# Patient Record
Sex: FEMALE | Race: WHITE | Employment: OTHER | URBAN - METROPOLITAN AREA
[De-identification: names, ages, dates, MRNs, and addresses within clinical notes are randomized per-mention and may not be internally consistent; named-entity substitution may affect disease eponyms.]

---

## 2017-12-21 ENCOUNTER — APPOINTMENT (OUTPATIENT)
Dept: CT IMAGING | Age: 62
End: 2017-12-21
Attending: EMERGENCY MEDICINE
Payer: COMMERCIAL

## 2017-12-21 ENCOUNTER — HOSPITAL ENCOUNTER (EMERGENCY)
Age: 62
Discharge: HOME | End: 2017-12-21
Attending: EMERGENCY MEDICINE
Payer: COMMERCIAL

## 2017-12-21 VITALS
SYSTOLIC BLOOD PRESSURE: 121 MMHG | BODY MASS INDEX: 24.64 KG/M2 | HEIGHT: 68 IN | OXYGEN SATURATION: 97 % | TEMPERATURE: 98.6 F | HEART RATE: 83 BPM | RESPIRATION RATE: 16 BRPM | DIASTOLIC BLOOD PRESSURE: 74 MMHG | WEIGHT: 162.6 LBS

## 2017-12-21 DIAGNOSIS — R10.11 ABDOMINAL PAIN, RIGHT UPPER QUADRANT: Primary | ICD-10-CM

## 2017-12-21 LAB
ALBUMIN SERPL-MCNC: 3.8 G/DL (ref 3.5–5)
ALBUMIN/GLOB SERPL: 1.4 {RATIO} (ref 1.1–2.2)
ALP SERPL-CCNC: 85 U/L (ref 45–117)
ALT SERPL-CCNC: 20 U/L (ref 12–78)
ANION GAP SERPL CALC-SCNC: 5 MMOL/L (ref 5–15)
APPEARANCE UR: CLEAR
AST SERPL-CCNC: 17 U/L (ref 15–37)
BACTERIA URNS QL MICRO: NEGATIVE /HPF
BASOPHILS # BLD: 0 K/UL (ref 0–0.1)
BASOPHILS NFR BLD: 0 % (ref 0–1)
BILIRUB SERPL-MCNC: 0.3 MG/DL (ref 0.2–1)
BILIRUB UR QL: NEGATIVE
BUN SERPL-MCNC: 17 MG/DL (ref 6–20)
BUN/CREAT SERPL: 24 (ref 12–20)
CALCIUM SERPL-MCNC: 8.6 MG/DL (ref 8.5–10.1)
CHLORIDE SERPL-SCNC: 107 MMOL/L (ref 97–108)
CO2 SERPL-SCNC: 29 MMOL/L (ref 21–32)
COLOR UR: NORMAL
CREAT SERPL-MCNC: 0.71 MG/DL (ref 0.55–1.02)
EOSINOPHIL # BLD: 0.1 K/UL (ref 0–0.4)
EOSINOPHIL NFR BLD: 2 % (ref 0–7)
EPITH CASTS URNS QL MICRO: NORMAL /LPF
ERYTHROCYTE [DISTWIDTH] IN BLOOD BY AUTOMATED COUNT: 12.7 % (ref 11.5–14.5)
GLOBULIN SER CALC-MCNC: 2.7 G/DL (ref 2–4)
GLUCOSE SERPL-MCNC: 91 MG/DL (ref 65–100)
GLUCOSE UR STRIP.AUTO-MCNC: NEGATIVE MG/DL
HCT VFR BLD AUTO: 38.4 % (ref 35–47)
HGB BLD-MCNC: 12.5 G/DL (ref 11.5–16)
HGB UR QL STRIP: NEGATIVE
HYALINE CASTS URNS QL MICRO: NORMAL /LPF (ref 0–5)
KETONES UR QL STRIP.AUTO: NEGATIVE MG/DL
LEUKOCYTE ESTERASE UR QL STRIP.AUTO: NEGATIVE
LIPASE SERPL-CCNC: 221 U/L (ref 73–393)
LYMPHOCYTES # BLD: 1.1 K/UL (ref 0.8–3.5)
LYMPHOCYTES NFR BLD: 21 % (ref 12–49)
MCH RBC QN AUTO: 29.6 PG (ref 26–34)
MCHC RBC AUTO-ENTMCNC: 32.6 G/DL (ref 30–36.5)
MCV RBC AUTO: 91 FL (ref 80–99)
MONOCYTES # BLD: 0.3 K/UL (ref 0–1)
MONOCYTES NFR BLD: 6 % (ref 5–13)
NEUTS SEG # BLD: 3.6 K/UL (ref 1.8–8)
NEUTS SEG NFR BLD: 71 % (ref 32–75)
NITRITE UR QL STRIP.AUTO: NEGATIVE
PH UR STRIP: 7.5 [PH] (ref 5–8)
PLATELET # BLD AUTO: 206 K/UL (ref 150–400)
POTASSIUM SERPL-SCNC: 4 MMOL/L (ref 3.5–5.1)
PROT SERPL-MCNC: 6.5 G/DL (ref 6.4–8.2)
PROT UR STRIP-MCNC: NEGATIVE MG/DL
RBC # BLD AUTO: 4.22 M/UL (ref 3.8–5.2)
RBC #/AREA URNS HPF: NORMAL /HPF (ref 0–5)
SODIUM SERPL-SCNC: 141 MMOL/L (ref 136–145)
SP GR UR REFRACTOMETRY: 1.01 (ref 1–1.03)
UR CULT HOLD, URHOLD: NORMAL
UROBILINOGEN UR QL STRIP.AUTO: 0.2 EU/DL (ref 0.2–1)
WBC # BLD AUTO: 5.1 K/UL (ref 3.6–11)
WBC URNS QL MICRO: NORMAL /HPF (ref 0–4)

## 2017-12-21 PROCEDURE — 74011636320 HC RX REV CODE- 636/320: Performed by: EMERGENCY MEDICINE

## 2017-12-21 PROCEDURE — 74011250636 HC RX REV CODE- 250/636: Performed by: EMERGENCY MEDICINE

## 2017-12-21 PROCEDURE — 83690 ASSAY OF LIPASE: CPT | Performed by: EMERGENCY MEDICINE

## 2017-12-21 PROCEDURE — 74011000258 HC RX REV CODE- 258: Performed by: EMERGENCY MEDICINE

## 2017-12-21 PROCEDURE — 85025 COMPLETE CBC W/AUTO DIFF WBC: CPT | Performed by: EMERGENCY MEDICINE

## 2017-12-21 PROCEDURE — 96374 THER/PROPH/DIAG INJ IV PUSH: CPT

## 2017-12-21 PROCEDURE — 99283 EMERGENCY DEPT VISIT LOW MDM: CPT

## 2017-12-21 PROCEDURE — 96361 HYDRATE IV INFUSION ADD-ON: CPT

## 2017-12-21 PROCEDURE — 81001 URINALYSIS AUTO W/SCOPE: CPT | Performed by: EMERGENCY MEDICINE

## 2017-12-21 PROCEDURE — 96375 TX/PRO/DX INJ NEW DRUG ADDON: CPT

## 2017-12-21 PROCEDURE — 74011000250 HC RX REV CODE- 250: Performed by: EMERGENCY MEDICINE

## 2017-12-21 PROCEDURE — 36415 COLL VENOUS BLD VENIPUNCTURE: CPT | Performed by: EMERGENCY MEDICINE

## 2017-12-21 PROCEDURE — 80053 COMPREHEN METABOLIC PANEL: CPT | Performed by: EMERGENCY MEDICINE

## 2017-12-21 PROCEDURE — 74177 CT ABD & PELVIS W/CONTRAST: CPT

## 2017-12-21 RX ORDER — DICYCLOMINE HYDROCHLORIDE 10 MG/ML
20 INJECTION INTRAMUSCULAR
Status: DISCONTINUED | OUTPATIENT
Start: 2017-12-21 | End: 2017-12-21 | Stop reason: HOSPADM

## 2017-12-21 RX ORDER — DICYCLOMINE HYDROCHLORIDE 20 MG/1
20 TABLET ORAL EVERY 6 HOURS
Qty: 20 TAB | Refills: 0 | Status: SHIPPED | OUTPATIENT
Start: 2017-12-21 | End: 2017-12-26

## 2017-12-21 RX ORDER — ONDANSETRON 2 MG/ML
4 INJECTION INTRAMUSCULAR; INTRAVENOUS
Status: COMPLETED | OUTPATIENT
Start: 2017-12-21 | End: 2017-12-21

## 2017-12-21 RX ORDER — SODIUM CHLORIDE 0.9 % (FLUSH) 0.9 %
10 SYRINGE (ML) INJECTION
Status: COMPLETED | OUTPATIENT
Start: 2017-12-21 | End: 2017-12-21

## 2017-12-21 RX ADMIN — SODIUM CHLORIDE 1000 ML: 900 INJECTION, SOLUTION INTRAVENOUS at 10:22

## 2017-12-21 RX ADMIN — IOPAMIDOL 100 ML: 755 INJECTION, SOLUTION INTRAVENOUS at 11:39

## 2017-12-21 RX ADMIN — Medication 10 ML: at 11:39

## 2017-12-21 RX ADMIN — FAMOTIDINE 20 MG: 10 INJECTION, SOLUTION INTRAVENOUS at 10:21

## 2017-12-21 RX ADMIN — SODIUM CHLORIDE 100 ML: 900 INJECTION, SOLUTION INTRAVENOUS at 11:39

## 2017-12-21 RX ADMIN — ONDANSETRON 4 MG: 2 INJECTION INTRAMUSCULAR; INTRAVENOUS at 10:22

## 2017-12-21 NOTE — ED PROVIDER NOTES
Patient is a 57 y.o. female presenting with abdominal pain. Abdominal Pain    Pertinent negatives include no diarrhea, no vomiting, no dysuria, no headaches and no back pain. Pt states that she has had intermittent, episodic stabbing RUQ pain since February. She has had a \"normal ultrasound\" and is scheduled for a hida scan on Dec 26, 2017 but states that she can not tolerate the pain. Denies any fever, difficulty breathing, difficulty swallowing, SOB or chest pain. Denies any nausea, vomiting or diarrhea. Pt. Reports that she has not had any medications today prior to arrival.   Old charts reviewed    Past Medical History:   Diagnosis Date    Hyperlipidemia     Hypothyroid     Psychiatric disorder     depression       Past Surgical History:   Procedure Laterality Date    HX HYSTERECTOMY      HX ORTHOPAEDIC      R thumb joint replacement    HX ORTHOPAEDIC      L thumb         History reviewed. No pertinent family history. Social History     Social History    Marital status: SINGLE     Spouse name: N/A    Number of children: N/A    Years of education: N/A     Occupational History    Not on file. Social History Main Topics    Smoking status: Never Smoker    Smokeless tobacco: Never Used    Alcohol use No    Drug use: No    Sexual activity: Not on file     Other Topics Concern    Not on file     Social History Narrative         ALLERGIES: Bactrim [sulfamethoprim ds]; Codeine; Morphine; Phenergan fortis; and Ultram [tramadol]    Review of Systems   Constitutional: Negative for activity change and appetite change. HENT: Negative for facial swelling, sore throat and trouble swallowing. Eyes: Negative. Respiratory: Negative for shortness of breath. Cardiovascular: Negative. Gastrointestinal: Positive for abdominal pain. Negative for diarrhea and vomiting. RUQ pain   Genitourinary: Negative for dysuria. Musculoskeletal: Negative for back pain and neck pain.    Skin: Negative for color change. Neurological: Negative for headaches. Psychiatric/Behavioral: Negative. Vitals:    12/21/17 0947   BP: 134/83   Pulse: 84   Resp: 16   Temp: 98.4 °F (36.9 °C)   SpO2: 99%   Weight: 73.8 kg (162 lb 9.6 oz)   Height: 5' 8\" (1.727 m)            Physical Exam   Constitutional: She is oriented to person, place, and time. She appears well-nourished. White female; non smoker; retired   HENT:   Head: Normocephalic. Right Ear: External ear normal.   Left Ear: External ear normal.   Mouth/Throat: Oropharynx is clear and moist.   Eyes: Pupils are equal, round, and reactive to light. Neck: Normal range of motion. Neck supple. Cardiovascular: Normal rate and regular rhythm. Pulmonary/Chest: Effort normal and breath sounds normal.   Abdominal: Soft. Bowel sounds are normal. She exhibits no distension and no mass. There is tenderness. There is no rebound and no guarding. RUQ pain   Musculoskeletal: Normal range of motion. Neurological: She is alert and oriented to person, place, and time. Skin: Skin is warm and dry. No rash noted. Nursing note and vitals reviewed. MDM  ED Course       Procedures    Pt  Has been re-examined and denies any new c/o pain and discomfort. Plan to try short course of bentyl with close gastroenterology follow up for possible endoscopy. Encourage pt to maintain a food diary. 1:07 PM  Patient's results and plan of care have been reviewed with her  Patient has verbally conveyed her  understanding and agreement of her signs, symptoms, diagnosis, treatment and prognosis and additionally agrees to follow up as recommended or return to the Emergency Room should her condition change prior to follow-up. Discharge instructions have also been provided to the patient with some educational information regarding her diagnosis as well a list of reasons why she would want to return to the ER prior to her follow-up appointment should her condition change. Mahin Dunham Lisa, NP

## 2017-12-21 NOTE — ED TRIAGE NOTES
Pt. complains of RUQ abd pain \"for a while\". Seen by MD in October and wanted pt to have HIDA scan, scheduled for Tuesday. Pt. states pain now radiates into her back and is having nausea, feeling worse.

## 2017-12-21 NOTE — DISCHARGE INSTRUCTIONS
Abdominal Pain: Care Instructions  Your Care Instructions    Abdominal pain has many possible causes. Some aren't serious and get better on their own in a few days. Others need more testing and treatment. If your pain continues or gets worse, you need to be rechecked and may need more tests to find out what is wrong. You may need surgery to correct the problem. Don't ignore new symptoms, such as fever, nausea and vomiting, urination problems, pain that gets worse, and dizziness. These may be signs of a more serious problem. Your doctor may have recommended a follow-up visit in the next 8 to 12 hours. If you are not getting better, you may need more tests or treatment. The doctor has checked you carefully, but problems can develop later. If you notice any problems or new symptoms, get medical treatment right away. Follow-up care is a key part of your treatment and safety. Be sure to make and go to all appointments, and call your doctor if you are having problems. It's also a good idea to know your test results and keep a list of the medicines you take. How can you care for yourself at home? · Rest until you feel better. · To prevent dehydration, drink plenty of fluids, enough so that your urine is light yellow or clear like water. Choose water and other caffeine-free clear liquids until you feel better. If you have kidney, heart, or liver disease and have to limit fluids, talk with your doctor before you increase the amount of fluids you drink. · If your stomach is upset, eat mild foods, such as rice, dry toast or crackers, bananas, and applesauce. Try eating several small meals instead of two or three large ones. · Wait until 48 hours after all symptoms have gone away before you have spicy foods, alcohol, and drinks that contain caffeine. · Do not eat foods that are high in fat. · Avoid anti-inflammatory medicines such as aspirin, ibuprofen (Advil, Motrin), and naproxen (Aleve).  These can cause stomach upset. Talk to your doctor if you take daily aspirin for another health problem. When should you call for help? Call 911 anytime you think you may need emergency care. For example, call if:  ? · You passed out (lost consciousness). ? · You pass maroon or very bloody stools. ? · You vomit blood or what looks like coffee grounds. ? · You have new, severe belly pain. ?Call your doctor now or seek immediate medical care if:  ? · Your pain gets worse, especially if it becomes focused in one area of your belly. ? · You have a new or higher fever. ? · Your stools are black and look like tar, or they have streaks of blood. ? · You have unexpected vaginal bleeding. ? · You have symptoms of a urinary tract infection. These may include:  ¨ Pain when you urinate. ¨ Urinating more often than usual.  ¨ Blood in your urine. ? · You are dizzy or lightheaded, or you feel like you may faint. ? Watch closely for changes in your health, and be sure to contact your doctor if:  ? · You are not getting better after 1 day (24 hours). Where can you learn more? Go to http://edmundo-kadie.info/. Enter K517 in the search box to learn more about \"Abdominal Pain: Care Instructions. \"  Current as of: March 20, 2017  Content Version: 11.4  © 9975-1415 [employer]. Care instructions adapted under license by [employer] (which disclaims liability or warranty for this information). If you have questions about a medical condition or this instruction, always ask your healthcare professional. [place] [address] any warranty or liability for your use of this information.

## 2018-02-26 ENCOUNTER — HOSPITAL ENCOUNTER (OUTPATIENT)
Dept: NUCLEAR MEDICINE | Age: 63
Discharge: HOME | End: 2018-02-26
Attending: GENERAL PRACTICE
Payer: MEDICARE

## 2018-02-26 VITALS — WEIGHT: 165 LBS | BODY MASS INDEX: 25.09 KG/M2

## 2018-02-26 DIAGNOSIS — K82.8 ADHESION OF GALLBLADDER: ICD-10-CM

## 2018-02-26 DIAGNOSIS — R10.11 ABDOMINAL PAIN, RIGHT UPPER QUADRANT: ICD-10-CM

## 2018-02-26 PROCEDURE — 74011250636 HC RX REV CODE- 250/636

## 2018-02-26 PROCEDURE — 78227 HEPATOBIL SYST IMAGE W/DRUG: CPT

## 2018-02-26 PROCEDURE — 74011000258 HC RX REV CODE- 258

## 2018-02-26 RX ORDER — SODIUM CHLORIDE 0.9 % (FLUSH) 0.9 %
10 SYRINGE (ML) INJECTION
Status: COMPLETED | OUTPATIENT
Start: 2018-02-26 | End: 2018-02-26

## 2018-02-26 RX ORDER — SODIUM CHLORIDE 9 MG/ML
25 INJECTION, SOLUTION INTRAVENOUS
Status: COMPLETED | OUTPATIENT
Start: 2018-02-26 | End: 2018-02-26

## 2018-02-26 RX ADMIN — SINCALIDE 1.5 MCG: 5 INJECTION, POWDER, LYOPHILIZED, FOR SOLUTION INTRAVENOUS at 12:10

## 2018-02-26 RX ADMIN — SODIUM CHLORIDE 25 ML: 900 INJECTION, SOLUTION INTRAVENOUS at 12:10

## 2018-02-26 RX ADMIN — Medication 10 ML: at 11:05

## 2018-03-06 ENCOUNTER — HOSPITAL ENCOUNTER (OUTPATIENT)
Dept: MRI IMAGING | Age: 63
Discharge: HOME | End: 2018-03-06
Attending: GENERAL PRACTICE
Payer: MEDICARE

## 2018-03-06 VITALS — BODY MASS INDEX: 25.09 KG/M2 | WEIGHT: 165 LBS

## 2018-03-06 DIAGNOSIS — R10.11 ABDOMINAL PAIN, RIGHT UPPER QUADRANT: ICD-10-CM

## 2018-03-06 PROCEDURE — 74183 MRI ABD W/O CNTR FLWD CNTR: CPT

## 2018-03-06 PROCEDURE — A9577 INJ MULTIHANCE: HCPCS

## 2018-03-06 PROCEDURE — 74011250636 HC RX REV CODE- 250/636

## 2018-03-06 PROCEDURE — 74011000258 HC RX REV CODE- 258

## 2018-03-06 RX ADMIN — GADOBENATE DIMEGLUMINE 15 ML: 529 INJECTION, SOLUTION INTRAVENOUS at 19:00

## 2018-03-06 RX ADMIN — SODIUM CHLORIDE 40 ML: 900 INJECTION, SOLUTION INTRAVENOUS at 19:00

## 2018-09-03 ENCOUNTER — APPOINTMENT (OUTPATIENT)
Dept: CT IMAGING | Age: 63
End: 2018-09-03
Attending: EMERGENCY MEDICINE
Payer: MEDICARE

## 2018-09-03 ENCOUNTER — HOSPITAL ENCOUNTER (EMERGENCY)
Age: 63
Discharge: HOME | End: 2018-09-03
Attending: EMERGENCY MEDICINE
Payer: MEDICARE

## 2018-09-03 VITALS
OXYGEN SATURATION: 98 % | DIASTOLIC BLOOD PRESSURE: 58 MMHG | WEIGHT: 181 LBS | BODY MASS INDEX: 28.41 KG/M2 | HEART RATE: 83 BPM | RESPIRATION RATE: 15 BRPM | SYSTOLIC BLOOD PRESSURE: 120 MMHG | HEIGHT: 67 IN | TEMPERATURE: 97.9 F

## 2018-09-03 DIAGNOSIS — R51.9 ACUTE NONINTRACTABLE HEADACHE, UNSPECIFIED HEADACHE TYPE: Primary | ICD-10-CM

## 2018-09-03 PROCEDURE — 99283 EMERGENCY DEPT VISIT LOW MDM: CPT

## 2018-09-03 PROCEDURE — 70450 CT HEAD/BRAIN W/O DYE: CPT

## 2018-09-03 NOTE — ED NOTES
Patient verbalizes understanding of discharge instructions. Opportunity for questions provided. Patient in no apparent distress, VSS. Patient ambulatory upon discharge. . 
Visit Vitals  /58 (BP 1 Location: Left arm, BP Patient Position: Sitting)  Pulse 83  Temp 97.9 °F (36.6 °C)  Resp 15  Ht 5' 7\" (1.702 m)  Wt 82.1 kg (181 lb)  SpO2 98%  BMI 28.35 kg/m2

## 2018-09-03 NOTE — ED PROVIDER NOTES
HPI Comments: 70 y.o. female with past medical history significant for hypothyroidism, depression, and cervical CA who presents from home via private vehicle with chief complaint of headache. Pt reports being woken up with a pulsating throbbing in the back of her head about 0500 this morning that has gradually gotten better since. Pt states that she has had a localized pulsating occipital HA intermittently over the last 3 months, notes they have become more diffuse over time. Pt notes that there are no patterns in the onset of her pulsating HA. Pt reports visiting her PCP for the HA about 1.5 months ago without obtaining any treatment or medical advice for HA - has not had imaging. Pt states she is under stress due to her 's hospitalization for a stroke, but also notes that she did not initially get this HA while under stress. Pt reports a h/o of an ocular migraine but reports that the current HA is not similar to the ocular migraine. Pt also states she wears glasses for reading and for nearsightedness, and mentions that she has not seen her ophthalmologist in a year. Pt denies taking any medications PTA today, but notes taking Advil for the HAs intermittently without significant relief. Pt denies any fever, blurry vision, vomiting, nausea, problems with coordination, problems driving, head injury, numbness or weakness. There are no other acute medical concerns at this time. Old Chart Review: Last ED visit was 12/21/17 for RUQ abdominal pain, CT abdomen was normal. In March 2018, pt had an MRI of her abdomen that was normal.  
 
Social hx: Nonsmoker; No EtOH use PCP: Sumit Lai DO Note written by Beuford Gowers, Scribe, as dictated by Kimberly Reid DO 8:42 AM 
 
The history is provided by the patient and medical records. No  was used. Past Medical History:  
Diagnosis Date  Cervical ca ([facility] [address]  Hyperlipidemia  Hypothyroid  Psychiatric disorder depression Past Surgical History:  
Procedure Laterality Date  HX HYSTERECTOMY  HX ORTHOPAEDIC    
 R thumb joint replacement  HX ORTHOPAEDIC    
 L thumb History reviewed. No pertinent family history. Social History Social History  Marital status: SINGLE Spouse name: N/A  
 Number of children: N/A  
 Years of education: N/A Occupational History  Not on file. Social History Main Topics  Smoking status: Never Smoker  Smokeless tobacco: Never Used  Alcohol use No  
 Drug use: No  
 Sexual activity: Not on file Other Topics Concern  Not on file Social History Narrative ALLERGIES: Bactrim [sulfamethoprim ds]; Codeine; Morphine; Phenergan fortis; and Ultram [tramadol] Review of Systems Constitutional: Negative for fever. Eyes: Negative for visual disturbance. Gastrointestinal: Negative for nausea and vomiting. Musculoskeletal: Negative for gait problem. Neurological: Positive for headaches. Negative for speech difficulty, weakness and numbness. All other systems reviewed and are negative. Vitals:  
 09/03/18 0715 BP: 137/79 Pulse: 82 Resp: 18 Temp: 97.5 °F (36.4 °C) SpO2: 97% Weight: 82.1 kg (181 lb) Height: 5' 7\" (1.702 m) Physical Exam  
  
Constitutional: Pt is awake and alert. Pt appears well-developed and well-nourished. NAD. HENT:  
Head: Normocephalic and atraumatic. Nose: Nose normal.  
Mouth/Throat: Oropharynx is clear and moist. No oropharyngeal exudate. Eyes: Conjunctivae and extraocular motions are normal. Pupils are equal, round, and reactive to light. Right eye exhibits no discharge. Left eye exhibits no discharge. No scleral icterus. No papilledema. Neck: No tracheal deviation present. Supple neck. Cardiovascular: Normal rate, regular rhythm, normal heart sounds and intact distal pulses. Exam reveals no gallop and no friction rub. No carotid bruits or murmurs heard. Pulmonary/Chest: Effort normal and breath sounds normal.  Pt  has no wheezes. Pt  has no rales. Abdominal: Soft. Pt  exhibits no distension and no mass. No tenderness. Pt  has no rebound and no guarding. Musculoskeletal:  Pt  exhibits no edema and no tenderness. Ext: Normal ROM in all four extremities; not tender to palpation; distal pulses are normal, no edema. Neurological:  Pt is alert. nonfocal neuro exam. Sensation and motor equal to bilateral lower extremities. No pronator drift, no facial asymmetry. Skin: Skin is warm and dry. Pt  is not diaphoretic. Psychiatric:  Pt  has a normal mood and affect. Behavior is normal.  
 
Note written by Loc Haley, as dictated by Eleuterio Herrera, DO 8:42 AM 
 
[facility] 
 
 
ED Course Procedures CT neg DC with PCP f/u [facility]

## 2018-09-03 NOTE — DISCHARGE INSTRUCTIONS
Headache: After Your Visit to the Emergency Room  Your Care Instructions  Headaches have many possible causes. Most headaches are not a sign of serious problems and will get better on their own. Home treatment may help you feel better soon. Even though you have been released from the emergency room, you still need to watch for any problems. The doctor carefully checked you. But sometimes problems can develop later. If you have new symptoms, or if your symptoms do not get better, return to the emergency room or call your doctor right away. A visit to the emergency room is only one step in your treatment. Even if you feel better, you still need to do what your doctor recommends, such as going to all suggested follow-up appointments and taking medicines exactly as directed. This will help you recover and help prevent future problems. How can you care for yourself at home? · Have someone drive you home if you have taken pain medicine. Do not drive yourself. · Rest in a quiet, dark room until your headache is gone. Close your eyes, and try to relax or go to sleep. Do not watch TV or read. · Put ice or a cold pack on the area for 10 to 20 minutes at a time. Put a thin cloth between the ice and your skin. · Use a warm, moist towel or a heating pad set on low to relax tight shoulder and neck muscles. · Have someone gently massage your neck and shoulders. · Take pain medicines exactly as directed. ¨ If the doctor gave you a prescription medicine for pain, take it as prescribed. ¨ If you are not taking a prescription pain medicine, ask your doctor if you can take an over-the-counter medicine. · Limit your caffeine intake to 1 to 2 cups a day. People who drink a lot of caffeine often get a headache several hours after their last drink of caffeine. Or they may wake up with a headache that is relieved by drinking caffeine. Cut down slowly to avoid caffeine-withdrawal headaches.   · Seek help if you have depression or anxiety. Your headaches may be linked to these conditions. When should you call for help? Call 911 if:  · You have signs of a stroke. These may include:  ¨ Sudden numbness, paralysis, or weakness in your face, arm, or leg, especially on only one side of your body. ¨ New problems with walking or balance. ¨ Sudden vision changes. ¨ Drooling or slurred speech. ¨ New problems speaking or understanding simple statements, or feeling confused. ¨ A sudden, severe headache that is different from past headaches. · You have other symptoms that you think are a medical emergency. Return to the emergency room now if:  · You get a fever and a stiff neck. · Your headache gets worse. · You have new nausea and vomiting, or you cannot keep down food or liquids. Call your doctor today if:  · Your headache does not get better within 1 to 2 days. · Your headaches get worse or happen more often. Where can you learn more? Go to Bazinga.be  Enter D038 in the search box to learn more about \"Headache: After Your Visit to the Emergency Room. \"   © 2390-9385 [employer]. Care instructions adapted under license by [employer] (which disclaims liability or warranty for this information). This care instruction is for use with your licensed healthcare professional. If you have questions about a medical condition or this instruction, always ask your healthcare professional. [place] [address] any warranty or liability for your use of this information.   Content Version: 9.3.34665; Last Revised: October 20, 2011

## 2018-09-03 NOTE — ED TRIAGE NOTES
Triage note: Patient c/o \"pulsating sensation\" to back of head beginning in June, intermittent. Patient reports 5/10 headache currently, beginning at 6am today. Patient denies visual changes, weakness or numbness, slurred speech or gait disturbances.

## 2018-09-25 ENCOUNTER — OFFICE VISIT (OUTPATIENT)
Dept: NEUROLOGY | Age: 63
End: 2018-09-25

## 2018-09-25 VITALS
BODY MASS INDEX: 26.47 KG/M2 | RESPIRATION RATE: 18 BRPM | HEART RATE: 94 BPM | SYSTOLIC BLOOD PRESSURE: 120 MMHG | OXYGEN SATURATION: 97 % | WEIGHT: 169 LBS | DIASTOLIC BLOOD PRESSURE: 80 MMHG

## 2018-09-25 DIAGNOSIS — G44.52 NEW DAILY PERSISTENT HEADACHE: ICD-10-CM

## 2018-09-25 DIAGNOSIS — H93.A3 PULSATILE TINNITUS OF BOTH EARS: Primary | ICD-10-CM

## 2018-09-25 DIAGNOSIS — R51.9 OCCIPITAL PAIN: ICD-10-CM

## 2018-09-25 NOTE — PROGRESS NOTES
Chief Complaint Patient presents with  
 Headache HISTORY OF PRESENT ILLNESS Marvin Haider is a 70 y.o. female who came in to discuss some symptoms that she has had for the past 2-3 months. She has been keeping mild headache mainly in the occipital head region and sometimes she will get a pulsating feeling in the back of her head that will continue for a few minutes and then subsides. It can happen multiple times a day. She does have a history of migraines/ocular migraines but this does not feel like her typical migraines. No changes in vision, speech or focal motor or sensory deficits. She recently was having these symptoms quite frequently and had to go to the emergency department. She had a CT brain which was negative. Sometimes the pulsating sensation will make her entire head throb/move. 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 Past Medical History:  
Diagnosis Date  Cervical ca ([facility] [address]  Hyperlipidemia  Hypothyroid  Psychiatric disorder   
 depression Current Outpatient Prescriptions Medication Sig  
 OTHER Estrogen Compound 1 capsule daily  metroNIDAZOLE (METROCREAM) 0.75 % topical cream Apply  to affected area daily. Use a thin layer to affected areas after washing  levothyroxine (SYNTHROID) 75 mcg tablet Take  by mouth Daily (before breakfast).  escitalopram (LEXAPRO) 20 mg tablet Take 20 mg by mouth daily.  testosterone (ANDROGEL) 50 mg/5 gram (1 %) gel 5 g by TransDERmal route daily. No current facility-administered medications for this visit. Allergies Allergen Reactions  Bactrim [Sulfamethoprim Ds] Unknown (comments)  Codeine Nausea Only  Morphine Itching  Phenergan Fortis Other (comments) Parkinson like symptoms  Ultram [Tramadol] Nausea Only Family History Problem Relation Age of Onset  Heart Disease Mother  Heart Disease Father  Stroke Brother Social History Substance Use Topics  Smoking status: Never Smoker  Smokeless tobacco: Never Used  Alcohol use No  
 
Past Surgical History:  
Procedure Laterality Date  HX HYSTERECTOMY  HX ORTHOPAEDIC    
 R thumb joint replacement  HX ORTHOPAEDIC    
 L thumb REVIEW OF SYSTEMS Review of Systems - History obtained from the patient Psychological ROS: negative ENT ROS: negative Hematological and Lymphatic ROS: negative Endocrine ROS: negative Respiratory ROS: no cough, shortness of breath, or wheezing Cardiovascular ROS: no chest pain or dyspnea on exertion Gastrointestinal ROS: no abdominal pain, change in bowel habits, or black or bloody stools Genito-Urinary ROS: no dysuria, trouble voiding, or hematuria Musculoskeletal ROS: negative Dermatological ROS: negative PHYSICAL EXAMINATION:   
Visit Vitals  /80  Pulse 94  Resp 18  Wt 76.7 kg (169 lb)  SpO2 97%  BMI 26.47 kg/m2 General:  Well defined, nourished, and groomed individual in no acute distress. Neck: Supple, nontender, no bruits, no pain with resistance to active range of motion. Heart: Regular rate and rhythm, no murmurs, rub, or gallop. Normal S1S2. Lungs:  Clear to auscultation bilaterally with equal chest expansion, no cough, no wheeze Musculoskeletal:  Extremities revealed no edema and had full range of motion of joints. Psych:  Good mood and bright affect NEUROLOGICAL EXAMINATION:    
Mental Status:   Alert and oriented to person, place, and time . Attention span and concentration are normal. Speech is fluent Cranial Nerves:   
II, III, IV, VI:  Visual acuity grossly intact. Visual fields are normal.   
Pupils are equal, round, and reactive to light and accommodation. Extra-ocular movements are full and fluid. Fundoscopic exam was benign, no ptosis or nystagmus. V-XII: Hearing is grossly intact. Facial features are symmetric, with normal sensation and strength. The palate rises symmetrically and the tongue protrudes midline. Sternocleidomastoids 5/5. Motor Examination: Normal tone, bulk, and strength. 5/5 muscle strength throughout. No cogwheel rigidity or clonus present. Sensory exam:  Normal throughout to pinprick, temperature, and vibration sense. Normal proprioception. Coordination:  Finger to nose and rapid arm movement testing was normal.   No resting or intention tremor Gait and Station:  Steady. Normal arm swing. No Rhomberg or pronator drift. No muscle wasting or fasiculations noted. Reflexes:  DTRs 2+ throughout. Toes downgoing. LABS / IMAGING 
CT Results (most recent): 
 
Results from Hospital Encounter encounter on 09/03/18 CT HEAD WO CONT Narrative EXAM:  CT HEAD WO CONT INDICATION:   posterior throbbing headache. Intermittent x few months. COMPARISON: CT head 10/17/2012. TECHNIQUE: Unenhanced CT of the head was performed using 5 mm images. Brain and 
bone windows were generated. CT dose reduction was achieved through use of a 
standardized protocol tailored for this examination and automatic exposure 
control for dose modulation. FINDINGS: 
The ventricles are normal in size and position. Basilar cisterns are patent. No 
midline shift. There is no evidence of acute infarct, hemorrhage, or extraaxial 
fluid collection. Unchanged small calcification in the left dentate nucleus. The paranasal sinuses, mastoid air cells, and middle ears are clear. The orbital 
contents are within normal limits. There are no significant osseous or 
extracranial soft tissue lesions. Impression IMPRESSION: 
1. No evidence of acute intracranial abnormality. ASSESSMENT 
  ICD-10-CM ICD-9-CM 1. Pulsatile tinnitus of both ears H93. A3 388.30 MRI BRAIN WO CONT  
   MRA BRAIN WO CONT 2. Occipital pain R51 784.0 MRI BRAIN WO CONT 3. New daily persistent headache G44.52 339.42 MRI BRAIN WO CONT  
   MRA BRAIN WO CONT  
 
 
DISCUSSION Ms. Komal Luna has had multiple symptoms including new daily persistent dull headache in the occipital head region along with the pulsating sensation that comes and goes. She has also been having ringing sounds in her ears bilaterally that are in sync with the heartbeat. Since her CT brain was negative, I have recommended MRI brain to rule out structural posterior fossa abnormality and also MRA to rule ou aneurysm/vascular malformation Yamileth Cole MD 
Diplomate, American Board of Psychiatry & Neurology (Neurology) [place] Board of Psychiatry & Neurology (Clinical Neurophysiology) Diplomate, [address] [place] [address] Board of Electrodiagnostic Medicine

## 2018-09-25 NOTE — PATIENT INSTRUCTIONS

## 2018-09-25 NOTE — MR AVS SNAPSHOT
[facility] [address] 
[phone] Patient: Pramod Colon MRN: [id number] Visit Information Date & Time Provider Department Dept. Phone Encounter #  
 9/25/2018  1:00 PM Marisol Israel MD [facility] at [address] [id number] Follow-up Instructions Return if symptoms worsen or fail to improve. Upcoming Health Maintenance Date Due Hepatitis C Screening 1955 DTaP/Tdap/Td series (1 - Tdap) 1/24/1974 Shingrix Vaccine Age 50> (1 of 2) 1/24/2003 BREAST CANCER SCRN MAMMOGRAM 1/24/2003 FOBT Q 1 YEAR AGE 50-75 1/24/2003 GLAUCOMA SCREENING Q2Y 1/24/2018 Bone Densitometry (Dexa) Screening 1/24/2018 Pneumococcal 65+ Low/Medium Risk (1 of 2 - PCV13) 1/24/2018 MEDICARE YEARLY EXAM 3/20/2018 Influenza Age 5 to Adult 8/1/2018 Allergies as of 9/25/2018  Review Complete On: 9/25/2018 By: Marisol Israel MD  
  
 Severity Noted Reaction Type Reactions Bactrim [Sulfamethoprim Ds]  08/02/2012    Unknown (comments) Codeine  08/02/2012    Nausea Only Morphine  12/21/2017    Itching Phenergan Fortis  08/02/2012    Other (comments) Parkinson like symptoms Ultram [Tramadol]  08/02/2012    Nausea Only Current Immunizations  Never Reviewed No immunizations on file. Not reviewed this visit You Were Diagnosed With   
  
 Codes Comments Pulsatile tinnitus of both ears    -  Primary ICD-10-CM: H93. A3 
ICD-9-CM: 388.30 Occipital pain     ICD-10-CM: R51 ICD-9-CM: 162. 0 New daily persistent headache     ICD-10-CM: G44.52 
ICD-9-CM: 339.42 Vitals BP Pulse Resp Weight(growth percentile) SpO2 BMI  
 120/80 94 18 169 lb (76.7 kg) 97% 26.47 kg/m2 OB Status Smoking Status Postmenopausal Never Smoker Vitals History BMI and BSA Data Body Mass Index Body Surface Area  
 26.47 kg/m 2 1.9 m 2 Your Updated Medication List  
  
   
This list is accurate as of 9/25/18  1:11 PM.  Always use your most recent med list.  
  
  
  
  
 LEXAPRO 20 mg tablet Generic drug:  escitalopram oxalate Take 20 mg by mouth daily. metroNIDAZOLE 0.75 % topical cream  
Commonly known as:  METROCREAM  
Apply  to affected area daily. Use a thin layer to affected areas after washing OTHER Estrogen Compound 1 capsule daily SYNTHROID 75 mcg tablet Generic drug:  levothyroxine Take  by mouth Daily (before breakfast). testosterone 50 mg/5 gram (1 %) gel Commonly known as:  ANDROGEL  
5 g by TransDERmal route daily. Follow-up Instructions Return if symptoms worsen or fail to improve. To-Do List   
 09/26/2018 Imaging:  MRA BRAIN WO CONT   
  
 09/26/2018 Imaging:  MRI BRAIN WO CONT Patient Instructions A Healthy Lifestyle: Care Instructions Your Care Instructions A healthy lifestyle can help you feel good, stay at a healthy weight, and have plenty of energy for both work and play. A healthy lifestyle is something you can share with your whole family. A healthy lifestyle also can lower your risk for serious health problems, such as high blood pressure, heart disease, and diabetes. You can follow a few steps listed below to improve your health and the health of your family. Follow-up care is a key part of your treatment and safety. Be sure to make and go to all appointments, and call your doctor if you are having problems. It's also a good idea to know your test results and keep a list of the medicines you take. How can you care for yourself at home? · Do not eat too much sugar, fat, or fast foods. You can still have dessert and treats now and then. The goal is moderation. · Start small to improve your eating habits.  Pay attention to portion sizes, drink less juice and soda pop, and eat more fruits and vegetables. ¨ Eat a healthy amount of food. A 3-ounce serving of meat, for example, is about the size of a deck of cards. Fill the rest of your plate with vegetables and whole grains. ¨ Limit the amount of soda and sports drinks you have every day. Drink more water when you are thirsty. ¨ Eat at least 5 servings of fruits and vegetables every day. It may seem like a lot, but it is not hard to reach this goal. A serving or helping is 1 piece of fruit, 1 cup of vegetables, or 2 cups of leafy, raw vegetables. Have an apple or some carrot sticks as an afternoon snack instead of a candy bar. Try to have fruits and/or vegetables at every meal. 
· Make exercise part of your daily routine. You may want to start with simple activities, such as walking, bicycling, or slow swimming. Try to be active 30 to 60 minutes every day. You do not need to do all 30 to 60 minutes all at once. For example, you can exercise 3 times a day for 10 or 20 minutes. Moderate exercise is safe for most people, but it is always a good idea to talk to your doctor before starting an exercise program. 
· Keep moving. Cristina Ramirez the lawn, work in the garden, or [employer]. Take the stairs instead of the elevator at work. · If you smoke, quit. People who smoke have an increased risk for heart attack, stroke, cancer, and other lung illnesses. Quitting is hard, but there are ways to boost your chance of quitting tobacco for good. ¨ Use nicotine gum, patches, or lozenges. ¨ Ask your doctor about stop-smoking programs and medicines. ¨ Keep trying. In addition to reducing your risk of diseases in the future, you will notice some benefits soon after you stop using tobacco. If you have shortness of breath or asthma symptoms, they will likely get better within a few weeks after you quit. · Limit how much alcohol you drink.  Moderate amounts of alcohol (up to 2 drinks a day for men, 1 drink a day for women) are okay. But drinking too much can lead to liver problems, high blood pressure, and other health problems. Family health If you have a family, there are many things you can do together to improve your health. · Eat meals together as a family as often as possible. · Eat healthy foods. This includes fruits, vegetables, lean meats and dairy, and whole grains. · Include your family in your fitness plan. Most people think of activities such as jogging or tennis as the way to fitness, but there are many ways you and your family can be more active. Anything that makes you breathe hard and gets your heart pumping is exercise. Here are some tips: 
¨ Walk to do errands or to take your child to school or the bus. ¨ Go for a family bike ride after dinner instead of watching TV. Where can you learn more? Go to http://edmundo-kadie.info/. Enter Z098 in the search box to learn more about \"A Healthy Lifestyle: Care Instructions. \" Current as of: December 7, 2017 Content Version: 11.7 © 0804-2970 [email or web address]. Care instructions adapted under license by [employer] (which disclaims liability or warranty for this information). If you have questions about a medical condition or this instruction, always ask your healthcare professional. [address] any warranty or liability for your use of this information. Introducing [facility]! Dear Liz Patino: Thank you for requesting a [employer] account. Our records indicate that you already have an active [employer] account. You can access your account anytime at [email or web address] Did you know that you can access your hospital and ER discharge instructions at any time in [employer]? You can also review all of your test results from your hospital stay or ER visit. Additional Information If you have questions, please visit the Frequently Asked Questions section of the [employer] website at [email or web address] Remember, [employer] is NOT to be used for urgent needs. For medical emergencies, dial 911. Now available from your iPhone and Android! Please provide this summary of care documentation to your next provider. Your primary care clinician is listed as Balta Eisenberg. If you have any questions after today's visit, please call [phone].

## 2018-10-25 ENCOUNTER — HOSPITAL ENCOUNTER (OUTPATIENT)
Dept: MRI IMAGING | Age: 63
Discharge: HOME | End: 2018-10-25
Attending: PSYCHIATRY & NEUROLOGY
Payer: MEDICARE

## 2018-10-25 DIAGNOSIS — H93.A3 PULSATILE TINNITUS OF BOTH EARS: ICD-10-CM

## 2018-10-25 DIAGNOSIS — G44.52 NEW DAILY PERSISTENT HEADACHE: ICD-10-CM

## 2018-10-25 DIAGNOSIS — R51.9 OCCIPITAL PAIN: ICD-10-CM

## 2018-10-25 PROCEDURE — 70544 MR ANGIOGRAPHY HEAD W/O DYE: CPT

## 2018-10-25 PROCEDURE — 70551 MRI BRAIN STEM W/O DYE: CPT

## 2018-10-26 ENCOUNTER — TELEPHONE (OUTPATIENT)
Dept: NEUROLOGY | Age: 63
End: 2018-10-26

## 2018-10-26 NOTE — TELEPHONE ENCOUNTER
----- Message from Vicente Ruelas sent at 10/25/2018  4:19 PM EDT -----  Regarding: Dr Javier Cross  Pt is inquiring about results of MRI done today,, would like to know if she needs to make an appt for the results, or will someone contact her    Best contact # [phone]

## 2018-10-29 DIAGNOSIS — I67.1 UNRUPTURED CEREBRAL ANEURYSM: Primary | ICD-10-CM

## 2018-10-29 NOTE — TELEPHONE ENCOUNTER
Per Dr Sukhi Gomez: Please inform that MRI of the brain was normal. Ksenia Curtis was okay except for a suspicion of a very small aneurysm.  Will need CTA of the head for further confirmation.  Will order. Spoke with patient about above information. She verbalized understanding.

## 2018-10-31 DIAGNOSIS — I67.1 UNRUPTURED CEREBRAL ANEURYSM: Primary | ICD-10-CM

## 2018-10-31 NOTE — PROGRESS NOTES
MRI of the brain was normal.  MRA showed a questionable small aneurysm. Will need CTA to confirm. Please inform. Order printed.

## 2018-11-05 ENCOUNTER — OFFICE VISIT (OUTPATIENT)
Dept: NEUROSURGERY | Age: 63
End: 2018-11-05

## 2018-11-05 VITALS
HEIGHT: 67 IN | OXYGEN SATURATION: 78 % | TEMPERATURE: 97.7 F | HEART RATE: 78 BPM | BODY MASS INDEX: 26.68 KG/M2 | WEIGHT: 170 LBS | DIASTOLIC BLOOD PRESSURE: 78 MMHG | SYSTOLIC BLOOD PRESSURE: 120 MMHG | RESPIRATION RATE: 16 BRPM

## 2018-11-05 DIAGNOSIS — H93.A1 PULSATILE TINNITUS OF RIGHT EAR: ICD-10-CM

## 2018-11-05 DIAGNOSIS — I67.1 ANEURYSM OF INTRACRANIAL PORTION OF RIGHT INTERNAL CAROTID ARTERY: Primary | ICD-10-CM

## 2018-11-05 NOTE — PROGRESS NOTES
New patient referred by Dr Jermain eMjia presenting with ringing in ears, headaches and finding of aneurysm on recent MRI/MRA. Patient reports ringing in ears for years. Headaches have  in frequency and intensity past few months. Reports she is not taking any medications at this time.

## 2018-11-05 NOTE — PATIENT INSTRUCTIONS
Today we discussed a 3 mm outpouching from your right carotid artery that may represent an aneurysm or a normal infundibulum (bulbous beginning to a normal blood vessel). CTA was ordered by Dr. Balwinder Greco. I will review and see you back in the office once completed. The pulsatile noise in your right ear is most likely caused by middle ear inflammation. Your ear looks slightly red today. Discuss with your PCP. Let us know if this symptom worsens after your eardrum appears normal.             Learning About Living Shawn Garcia  What is a living will? A living will is a legal form you use to write down the kind of care you want at the end of your life. It is used by the health professionals who will treat you if you aren't able to decide for yourself. If you put your wishes in writing, your loved ones and others will know what kind of care you want. They won't need to guess. This can ease your mind and be helpful to others. A living will is not the same as an estate or property will. An estate will explains what you want to happen with your money and property after you die. Is a living will a legal document? A living will is a legal document. Each state has its own laws about living [place]. If you move to another state, make sure that your living will is legal in the state where you now live. Or you might use a universal form that has been approved by many states. This kind of form can sometimes be completed and stored online. Your electronic copy will then be available wherever you have a connection to the Internet. In most cases, doctors will respect your wishes even if you have a form from a different state. · You don't need an  to complete a living will. But legal advice can be helpful if your state's laws are unclear, your health history is complicated, or your family can't agree on what should be in your living will. · You can change your living will at any time.  Some people find that their wishes about end-of-life care change as their health changes. · In addition to making a living will, think about completing a medical power of  form. This form lets you name the person you want to make end-of-life treatment decisions for you (your \"health care agent\") if you're not able to. Many hospitals and nursing homes will give you the forms you need to complete a living will and a medical power of . · Your living will is used only if you can't make or communicate decisions for yourself anymore. If you become able to make decisions again, you can accept or refuse any treatment, no matter what you wrote in your living will. · Your state may offer an online registry. This is a place where you can store your living will online so the doctors and nurses who need to treat you can find it right away. What should you think about when creating a living will? Talk about your end-of-life wishes with your family members and your doctor. Let them know what you want. That way the people making decisions for you won't be surprised by your choices. Think about these questions as you make your living will:  · Do you know enough about life support methods that might be used? If not, talk to your doctor so you know what might be done if you can't breathe on your own, your heart stops, or you're unable to swallow. · What things would you still want to be able to do after you receive life-support methods? Would you want to be able to walk? To speak? To eat on your own? To live without the help of machines? · If you have a choice, where do you want to be cared for? In your home? At a hospital or nursing home? · Do you want certain [employer]al practices performed if you become very ill? · If you have a choice at the end of your life, where would you prefer to die? At home? In a hospital or nursing home? Somewhere else? · Would you prefer to be buried or cremated?   · Do you want your organs to be donated after you die? What should you do with your living will? · Make sure that your family members and your health care agent have copies of your living will. · Give your doctor a copy of your living will to keep in your medical record. If you have more than one doctor, make sure that each one has a copy. · You may want to put a copy of your living will where it can be easily found. Where can you learn more? Go to http://edmundo-kadie.info/. Enter O547 in the search box to learn more about \"Learning About Living [facility]. \"  Current as of: April 19, 2018  Content Version: 11.8  © 6216-3154 Healthwise, [employer]. Care instructions adapted under license by [employer] (which disclaims liability or warranty for this information). If you have questions about a medical condition or this instruction, always ask your healthcare professional. [address] any warranty or liability for your use of this information.

## 2018-11-05 NOTE — PROGRESS NOTES
Neurointerventional Surgery  Ambulatory Progress Note      Patient: Olman Malin MRN: [id number]  SSN: [id number]    YOB: 1955  Age: 70 y.o. Sex: female      History of Present Illness:      Kristi Reyes is a 69 yo right handed female who was referred by Dr. 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 She does not endorse headache and describes her intermittent episodes of occipital pressure as \"painless\". She denies photophobia or aura. She also reports occasional right sided pulsatile tinnitus with exertion and a \"stopped up\" right ear. She does not endorse vision changes, ataxia, speech difficulties, vertigo or weakness. PMH: osteoarthritis    PSH:  Small MSK procedures x 4 (cysts), \"partial\" HUNTER x 2    Family history is noncontributory. 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 A1        Review of Systems    A comprehensive ROS was performed and was negative except for as per HPI. Objective:     Current Outpatient Medications   Medication Sig Dispense Refill    OTHER Estrogen Compound 1 capsule daily      metroNIDAZOLE (METROCREAM) 0.75 % topical cream Apply  to affected area daily. Use a thin layer to affected areas after washing      levothyroxine (SYNTHROID) 75 mcg tablet Take  by mouth Daily (before breakfast).  escitalopram (LEXAPRO) 20 mg tablet Take 20 mg by mouth daily.  testosterone (ANDROGEL) 50 mg/5 gram (1 %) gel 5 g by TransDERmal route daily.             Visit Vitals  /78 (BP 1 Location: Left arm)   Pulse 78   Temp 97.7 °F (36.5 °C)   Resp 16   Ht 5' 7\" (1.702 m)   Wt 170 lb (77.1 kg)   SpO2 (!) 78%   BMI 26.63 kg/m²       Allergies   Allergen Reactions    Bactrim [Sulfamethoprim Ds] Unknown (comments)    Codeine Nausea Only    Morphine Itching    Phenergan Fortis Other (comments)     Parkinson like symptoms    Ultram [Tramadol] Nausea Only       Current Outpatient Medications   Medication Sig    OTHER Estrogen Compound 1 capsule daily    metroNIDAZOLE (METROCREAM) 0.75 % topical cream Apply  to affected area daily. Use a thin layer to affected areas after washing    levothyroxine (SYNTHROID) 75 mcg tablet Take  by mouth Daily (before breakfast).  escitalopram (LEXAPRO) 20 mg tablet Take 20 mg by mouth daily.  testosterone (ANDROGEL) 50 mg/5 gram (1 %) gel 5 g by TransDERmal route daily. No current facility-administered medications for this visit. Physical Exam:  General: NAD  HEENT:  Right TM erythema inferiorly, No neck bruit or objective pulsatile tinnitus  Lungs: clear to auscultation bilaterally  Heart: regular rate and rhythm, S1, S2 normal, no murmur, click, rub or gallop    Neurologic Exam:  Mental Status:  Alert and oriented x 4. Appropriate affect, mood and behavior. Language:    Normal fluency, repetition, comprehension and naming. Cranial Nerves:   Pupils equal, round and reactive to light. Visual fields full to confrontation. Extraocular movements intact. Facial sensation intact V1 - V3. Full facial strength, no asymmetry. Hearing intact bilaterally. No dysarthria. Tongue protrudes to midline, palate elevates symmetrically. Shoulder shrug 5/5 bilaterally. Motor:    No pronator drift. Bulk and tone normal.      5/5 power in all extremities proximally and distally. No involuntary movements. Sensation:    Sensation intact throughout to light touch. Coordination & Gait: Normal, tandem gait normal, FTN intact.         Labs:  Lab Results   Component Value Date/Time    Sodium 141 12/21/2017 10:21 AM    Potassium 4.0 12/21/2017 10:21 AM    Chloride 107 12/21/2017 10:21 AM    CO2 29 12/21/2017 10:21 AM    Anion gap 5 12/21/2017 10:21 AM    Glucose 91 12/21/2017 10:21 AM    BUN 17 12/21/2017 10:21 AM    Creatinine 0.71 12/21/2017 10:21 AM    BUN/Creatinine ratio 24 (H) 12/21/2017 10:21 AM    GFR est AA >60 12/21/2017 10:21 AM    GFR est non-AA >60 12/21/2017 10:21 AM    Calcium 8.6 12/21/2017 10:21 AM     Lab Results   Component Value Date/Time    WBC 5.1 12/21/2017 10:21 AM    HGB 12.5 12/21/2017 10:21 AM    HCT 38.4 12/21/2017 10:21 AM    PLATELET 669 64/09/6925 10:21 AM    MCV 91.0 12/21/2017 10:21 AM     Lab Results   Component Value Date/Time    MCH 29.6 12/21/2017 10:21 AM    MCHC 32.6 12/21/2017 10:21 AM    BASOPHILS 0 12/21/2017 10:21 AM    ABS. LYMPHOCYTES 1.1 12/21/2017 10:21 AM    ABS. MONOCYTES 0.3 12/21/2017 10:21 AM    ABS. EOSINOPHILS 0.1 12/21/2017 10:21 AM    ABS. BASOPHILS 0.0 12/21/2017 10:21 AM       IMAGING:    I reviewed the imaging listed below. Mra Brain Wo Cont    Result Date: 10/25/2018  IMPRESSION: Normal MRI of the brain. No intracranial mass, hemorrhage or evidence of acute infarction. Findings suggestive of small inferiorly oriented aneurysm of the supraclinoid ICA on the right. This can be confirmed with CTA of the head. Aneurysm would measure less than 3 mm in size. Mri Brain Wo Cont    Result Date: 10/25/2018  IMPRESSION: Normal MRI of the brain. No intracranial mass, hemorrhage or evidence of acute infarction. Findings suggestive of small inferiorly oriented aneurysm of the supraclinoid ICA on the right. This can be confirmed with CTA of the head. Aneurysm would measure less than 3 mm in size. Assessment/Plan:       ICD-10-CM ICD-9-CM    1. 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 Pulsatile tinnitus of right ear H93. A1 388.30         - 3 mm MARGUERITE aneurysm vs normal variant infundibulum. CTA pending. Cerebral angiogram may be required for definitive characterization. - Patient will followup with PCP regarding right TM erythema and pulsatile tinnitus. Unlikely intracranial vascular issue.    - Thank you for the referral of this pleasant patient. Follow-up Disposition:    I have discussed the diagnosis with the patient and the intended plan as seen in the above orders. Patient is in agreement. The patient has received an after-visit summary and questions were answered concerning future plans. I have discussed medication side effects and warnings with the patient as well.       Nisha Guzman MD

## 2018-11-26 ENCOUNTER — HOSPITAL ENCOUNTER (OUTPATIENT)
Dept: CT IMAGING | Age: 63
Discharge: HOME | End: 2018-11-26
Attending: PSYCHIATRY & NEUROLOGY
Payer: MEDICARE

## 2018-11-26 DIAGNOSIS — I67.1 UNRUPTURED CEREBRAL ANEURYSM: ICD-10-CM

## 2018-11-26 LAB — CREAT BLD-MCNC: 0.7 MG/DL (ref 0.6–1.3)

## 2018-11-26 PROCEDURE — 70496 CT ANGIOGRAPHY HEAD: CPT

## 2018-11-26 PROCEDURE — 74011000258 HC RX REV CODE- 258: Performed by: RADIOLOGY

## 2018-11-26 PROCEDURE — 82565 ASSAY OF CREATININE: CPT

## 2018-11-26 PROCEDURE — 74011636320 HC RX REV CODE- 636/320: Performed by: RADIOLOGY

## 2018-11-26 RX ORDER — SODIUM CHLORIDE 0.9 % (FLUSH) 0.9 %
10 SYRINGE (ML) INJECTION
Status: COMPLETED | OUTPATIENT
Start: 2018-11-26 | End: 2018-11-26

## 2018-11-26 RX ADMIN — Medication 10 ML: at 12:58

## 2018-11-26 RX ADMIN — IOPAMIDOL 100 ML: 755 INJECTION, SOLUTION INTRAVENOUS at 12:00

## 2018-11-26 RX ADMIN — SODIUM CHLORIDE 100 ML: 900 INJECTION, SOLUTION INTRAVENOUS at 12:58

## 2018-12-07 ENCOUNTER — OFFICE VISIT (OUTPATIENT)
Dept: NEUROSURGERY | Age: 63
End: 2018-12-07

## 2018-12-07 VITALS
BODY MASS INDEX: 26.68 KG/M2 | OXYGEN SATURATION: 98 % | WEIGHT: 170 LBS | HEART RATE: 98 BPM | RESPIRATION RATE: 17 BRPM | SYSTOLIC BLOOD PRESSURE: 126 MMHG | DIASTOLIC BLOOD PRESSURE: 84 MMHG | HEIGHT: 67 IN | TEMPERATURE: 99 F

## 2018-12-07 DIAGNOSIS — I67.1 ANEURYSM OF INTRACRANIAL PORTION OF RIGHT INTERNAL CAROTID ARTERY: Primary | ICD-10-CM

## 2018-12-07 PROBLEM — H93.A1 PULSATILE TINNITUS OF RIGHT EAR: Status: RESOLVED | Noted: 2018-11-05 | Resolved: 2018-12-07

## 2018-12-07 RX ORDER — AZITHROMYCIN 250 MG/1
250 TABLET, FILM COATED ORAL DAILY
COMMUNITY
End: 2020-02-10

## 2018-12-07 NOTE — PROGRESS NOTES
Follow up for CTA results. Reports daily headaches. Currently has sinus infection and taking zithromax for 5 days. No acute problems reported.

## 2018-12-07 NOTE — PATIENT INSTRUCTIONS
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 The aneurysm occurs near the entry point of the artery into the dural ring. This location and size carry very low risk of rupture or bleeding into the brain. Risks of surgery would outweigh the benefit at this point. Plan:  Monitor the aneurysm for growth. Next MRA in 6 months. Your blood pressure goal is < 120/80. I agree with your decision to pursue alternative massage based therapies for your headaches. I do not think the headaches are associated with your small aneurysm. Take 81 mg aspirin by mouth once daily unless stomach issues or bleeding result. OK to stop if this happens or for surgeries in the future. Learning About Living [place]  What is a living will? A living will is a legal form you use to write down the kind of care you want at the end of your life. It is used by the health professionals who will treat you if you aren't able to decide for yourself. If you put your wishes in writing, your loved ones and others will know what kind of care you want. They won't need to guess. This can ease your mind and be helpful to others. A living will is not the same as an estate or property will. An estate will explains what you want to happen with your money and property after you die. Is a living will a legal document? A living will is a legal document. Each state has its own laws about living [place]. If you move to another state, make sure that your living will is legal in the state where you now live. Or you might use a universal form that has been approved by many states. This kind of form can sometimes be completed and stored online. Your electronic copy will then be available wherever you have a connection to the Internet. In most cases, doctors will respect your wishes even if you have a form from a different state. · You don't need an  to complete a living will.  But legal advice can be helpful if your state's laws are unclear, your health history is complicated, or your family can't agree on what should be in your living will. · You can change your living will at any time. Some people find that their wishes about end-of-life care change as their health changes. · In addition to making a living will, think about completing a medical power of  form. This form lets you name the person you want to make end-of-life treatment decisions for you (your \"health care agent\") if you're not able to. Many hospitals and nursing homes will give you the forms you need to complete a living will and a medical power of . · Your living will is used only if you can't make or communicate decisions for yourself anymore. If you become able to make decisions again, you can accept or refuse any treatment, no matter what you wrote in your living will. · Your state may offer an online registry. This is a place where you can store your living will online so the doctors and nurses who need to treat you can find it right away. What should you think about when creating a living will? Talk about your end-of-life wishes with your family members and your doctor. Let them know what you want. That way the people making decisions for you won't be surprised by your choices. Think about these questions as you make your living will:  · Do you know enough about life support methods that might be used? If not, talk to your doctor so you know what might be done if you can't breathe on your own, your heart stops, or you're unable to swallow. · What things would you still want to be able to do after you receive life-support methods? Would you want to be able to walk? To speak? To eat on your own? To live without the help of machines? · If you have a choice, where do you want to be cared for? In your home? At a hospital or nursing home? · Do you want certain Anabaptism practices performed if you become very ill?   · If you have a choice at the end of your life, where would you prefer to die? At home? In a hospital or nursing home? Somewhere else? · Would you prefer to be buried or cremated? · Do you want your organs to be donated after you die? What should you do with your living will? · Make sure that your family members and your health care agent have copies of your living will. · Give your doctor a copy of your living will to keep in your medical record. If you have more than one doctor, make sure that each one has a copy. · You may want to put a copy of your living will where it can be easily found. Where can you learn more? Go to http://edmundo-kadie.info/. Enter C423 in the search box to learn more about \"Learning About Living [place]jandra. \"  Current as of: April 19, 2018  Content Version: 11.8  © 9557-8357 [employer], [employer]. Care instructions adapted under license by [employer] (which disclaims liability or warranty for this information). If you have questions about a medical condition or this instruction, always ask your healthcare professional. [address] any warranty or liability for your use of this information.

## 2018-12-07 NOTE — PROGRESS NOTES
Neurointerventional Surgery  Ambulatory Progress Note      Patient: Genoveva Key MRN: [id number]  SSN: [id number]    YOB: 1955  Age: 70 y.o. Sex: female      History of Present Illness:      Kathia Gomez presents today for clinical followup and CTA image review of her right ICA aneurysm discovered incidentally. She reports improvement and even resolution of previously reported headaches with massage therapy since her last visit. Today she does not endorse vision changes or focal neurological symptoms since her last visit. She endorses chronic anxiety that seems to be situational and intermittent. She reports a negative carotid doppler study performed at a \"health fair\" recently. 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 Objective:     Current Outpatient Medications   Medication Sig Dispense Refill    azithromycin (ZITHROMAX) 250 mg tablet Take 250 mg by mouth daily.  OTHER Estrogen Compound 1 capsule daily      metroNIDAZOLE (METROCREAM) 0.75 % topical cream Apply  to affected area daily. Use a thin layer to affected areas after washing      levothyroxine (SYNTHROID) 75 mcg tablet Take  by mouth Daily (before breakfast).  escitalopram (LEXAPRO) 20 mg tablet Take 20 mg by mouth daily.  testosterone (ANDROGEL) 50 mg/5 gram (1 %) gel 5 g by TransDERmal route daily.             Visit Vitals  /84   Pulse 98   Temp 99 °F (37.2 °C)   Resp 17   Ht 5' 7\" (1.702 m)   Wt 170 lb (77.1 kg)   SpO2 98%   BMI 26.63 kg/m²       Allergies   Allergen Reactions    Bactrim [Sulfamethoprim Ds] Unknown (comments)    Codeine Nausea Only    Morphine Itching    Phenergan Fortis Other (comments)     Parkinson like symptoms    Ultram [Tramadol] Nausea Only Current Outpatient Medications   Medication Sig    azithromycin (ZITHROMAX) 250 mg tablet Take 250 mg by mouth daily.  OTHER Estrogen Compound 1 capsule daily    metroNIDAZOLE (METROCREAM) 0.75 % topical cream Apply  to affected area daily. Use a thin layer to affected areas after washing    levothyroxine (SYNTHROID) 75 mcg tablet Take  by mouth Daily (before breakfast).  escitalopram (LEXAPRO) 20 mg tablet Take 20 mg by mouth daily.  testosterone (ANDROGEL) 50 mg/5 gram (1 %) gel 5 g by TransDERmal route daily. No current facility-administered medications for this visit. Physical Exam:  General: NAD  HEENT:  No carotid bruit  Lungs: clear to auscultation bilaterally  Heart: regular rate and rhythm, S1, S2 normal, no murmur, click, rub or gallop  Extremities: chronic right small finger contracture, extremities otherwise normal, atraumatic, no cyanosis or edema    Neurologic Exam:  Mental Status:  Alert and oriented x 4. Appropriate affect, mood and behavior. Language:    Normal fluency, repetition, comprehension and naming. Cranial Nerves:   Pupils equal, round and reactive to light. Visual fields full to confrontation. Extraocular movements intact. Facial sensation intact V1 - V3. Full facial strength, no asymmetry. Hearing intact bilaterally. No dysarthria. Tongue protrudes to midline, palate elevates symmetrically. Shoulder shrug 5/5 bilaterally. Motor:    No pronator drift. Bulk and tone normal.      5/5 power in all extremities proximally and distally. No involuntary movements. Sensation:    Sensation intact throughout to light touch. Coordination & Gait: Normal, tandem gait normal, FTN and HTS intact.         Labs:  Lab Results   Component Value Date/Time    Sodium 141 12/21/2017 10:21 AM    Potassium 4.0 12/21/2017 10:21 AM    Chloride 107 12/21/2017 10:21 AM    CO2 29 12/21/2017 10:21 AM    Anion gap 5 12/21/2017 10:21 AM    Glucose 91 12/21/2017 10:21 AM    BUN 17 12/21/2017 10:21 AM    Creatinine 0.71 12/21/2017 10:21 AM    BUN/Creatinine ratio 24 (H) 12/21/2017 10:21 AM    GFR est AA >60 12/21/2017 10:21 AM    GFR est non-AA >60 12/21/2017 10:21 AM    Calcium 8.6 12/21/2017 10:21 AM     Lab Results   Component Value Date/Time    WBC 5.1 12/21/2017 10:21 AM    HGB 12.5 12/21/2017 10:21 AM    HCT 38.4 12/21/2017 10:21 AM    PLATELET 103 43/94/2642 10:21 AM    MCV 91.0 12/21/2017 10:21 AM     Lab Results   Component Value Date/Time    MCH 29.6 12/21/2017 10:21 AM    MCHC 32.6 12/21/2017 10:21 AM    BASOPHILS 0 12/21/2017 10:21 AM    ABS. LYMPHOCYTES 1.1 12/21/2017 10:21 AM    ABS. MONOCYTES 0.3 12/21/2017 10:21 AM    ABS. EOSINOPHILS 0.1 12/21/2017 10:21 AM    ABS. BASOPHILS 0.0 12/21/2017 10:21 AM       IMAGING:    I reviewed the imaging prior to the visit and with the patient. The very small MARGUERITE aneurysm described below is either below or just at the expected level of the dural ring. Cta Head    Result Date: 11/26/2018  IMPRESSION: 1. Small less than 3 mm \"carotid cave\" right internal carotid artery aneurysm. 2. Otherwise normal CTA of the head. Assessment/Plan:       ICD-10-CM ICD-9-CM    1. 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 Based on trial data, this aneurysm has a very low annual cumulative risk of rupture and SAH (<1% per year). After a discussion with the patient about the risks and benefits of endovascular treatment, she has chose conservative observation (no treatment) for now and I agree this is the best course of action. She has been entered into our aneurysm surveillance program and will undergo followup MRA in 6 months. Any growth or morphological change in the aneurysm would cause me to reconsider definitive treatment.          Follow-up Disposition:    I have discussed the diagnosis with the patient and the intended plan as seen in the above orders. Patient is in agreement. The patient has received an after-visit summary and questions were answered concerning future plans. I have discussed medication side effects and warnings with the patient as well.       Venancio Willett MD

## 2019-01-14 NOTE — PROGRESS NOTES
[facility] [place]: [place]  [id number]    HPI:  Ms. Clarence Palma is a 71 yo F with h/o hypothyroid, h/o shoulder surgery on 7/2011, question h/o MV prolapse. She is here due to palpitations. They occur once every two to three weeks lasting just a few seconds. She has had no associated lightheadedness, dizziness or syncope. Back in 2014, she had various cardiac evaluations, including a stress test and an echocardiogram that were normal.  She also had a Holter in 2012 that noted benign PVCs. She denies any exertional chest pain or shortness of breath. She walks every day, but wants to increase her level of activity. Incidentally, she also was recently found to have a small right ICA aneurysm and they are watching this and recommending conservative management. She is compensated on exam with clear lungs and no lower extremity edema. Her EKG is normal sinus rhythm, heart rate of 75, nonspecific ST-T wave abnormality. Fam Hx. Father 47 yo. Brother carotid dx age 72. Soc Hx. Works . No tobacco  Assessment and Plan:   1. Palpitations. These are very infrequent and she has no associated symptoms with this. She had a Holter in the past with benign PVCs and current presentation consistent with this. If she does develop concurrent symptoms, would consider a repeat monitor. 2. Family history of early coronary artery disease. She is currently asymptomatic cardiac wise. No additional cardiac evaluation is indicated at this time. She did have questions about coronary calcium scoring and her brother did have significant heart disease. With regards to optimizing primary prevention recommend regular followup with her primary care physician and risk factor management. With regard to coronary calcium scoring, she would not be excited to take a statin as is already on an aspirin a day. At least for now, I do not think there is any additional benefit to coronary calcium score for her. She  has a past medical history of Arthritis, Cervical ca ([address] Hyperlipidemia, Hypothyroid, Leg swelling, Psychiatric disorder, Skipped beats, and Snoring. All other systems negative except as above. PE  Vitals:    01/15/19 1524   BP: 110/76   Pulse: 76   Resp: 16   Weight: 175 lb 9.6 oz (79.7 kg)   Height: 5' 7\" (1.702 m)    Body mass index is 27.5 kg/m².    General appearance - alert, well appearing, and in no distress  Mental status - affect appropriate to mood  Neck - supple, no JVD  Chest - clear to auscultation, no wheezes, rales or rhonchi  Heart - normal rate, regular rhythm, normal S1, S2, no murmurs, rubs, clicks or gallops  Abdomen - soft, nontender, nondistended  Extremities - peripheral pulses normal, no pedal edema    Recent Labs:  Lab Results   Component Value Date/Time    Cholesterol, total 167 07/06/2009 03:35 AM    HDL Cholesterol 55 07/06/2009 03:35 AM    LDL, calculated 100.8 (H) 07/06/2009 03:35 AM    Triglyceride 56 07/06/2009 03:35 AM    CHOL/HDL Ratio 3.0 07/06/2009 03:35 AM     Lab Results   Component Value Date/Time    Creatinine 0.71 12/21/2017 10:21 AM     Lab Results   Component Value Date/Time    BUN 17 12/21/2017 10:21 AM     Lab Results   Component Value Date/Time    Potassium 4.0 12/21/2017 10:21 AM     No results found for: HBA1C, [id number]  Lab Results   Component Value Date/Time    HGB 12.5 12/21/2017 10:21 AM     Lab Results   Component Value Date/Time    PLATELET 331 62/85/2234 10:21 AM       Reviewed:  Past Medical History:   Diagnosis Date    Arthritis     Cervical ca (HCC)     Hyperlipidemia     Hypothyroid     Leg swelling     Psychiatric disorder     depression    Skipped beats     Snoring      Social History     Tobacco Use   Smoking Status Never Smoker   Smokeless Tobacco Never Used     Social History     Substance and Sexual Activity   Alcohol Use No     Allergies   Allergen Reactions    Bactrim [Sulfamethoprim Ds] Unknown (comments)    Codeine Nausea Only    Morphine Itching    Phenergan Fortis Other (comments)     Parkinson like symptoms    Ultram [Tramadol] Nausea Only       Current Outpatient Medications   Medication Sig    ibuprofen (ADVIL) 200 mg tablet Take 200 mg by mouth as needed for Pain.  azithromycin (ZITHROMAX) 250 mg tablet Take 250 mg by mouth daily.  OTHER Estrogen Compound 1 capsule daily    metroNIDAZOLE (METROCREAM) 0.75 % topical cream Apply  to affected area daily. Use a thin layer to affected areas after washing    levothyroxine (SYNTHROID) 75 mcg tablet Take  by mouth Daily (before breakfast).  escitalopram (LEXAPRO) 20 mg tablet Take 20 mg by mouth daily.  testosterone (ANDROGEL) 50 mg/5 gram (1 %) gel 5 g by TransDERmal route daily. No current facility-administered medications for this visit.         Montana Gil MD  [facility] [place]  [address]  [address]

## 2019-01-15 ENCOUNTER — OFFICE VISIT (OUTPATIENT)
Dept: CARDIOLOGY CLINIC | Age: 64
End: 2019-01-15

## 2019-01-15 VITALS
HEIGHT: 67 IN | HEART RATE: 76 BPM | SYSTOLIC BLOOD PRESSURE: 110 MMHG | RESPIRATION RATE: 16 BRPM | DIASTOLIC BLOOD PRESSURE: 76 MMHG | BODY MASS INDEX: 27.56 KG/M2 | WEIGHT: 175.6 LBS

## 2019-01-15 DIAGNOSIS — Z82.49 FAMILY HISTORY OF EARLY CAD: ICD-10-CM

## 2019-01-15 DIAGNOSIS — R00.2 HEART PALPITATIONS: Primary | ICD-10-CM

## 2019-01-15 RX ORDER — IBUPROFEN 200 MG
200 TABLET ORAL AS NEEDED
COMMUNITY
End: 2020-02-10

## 2019-01-15 NOTE — LETTER
1/16/2019 9:01 AM 
 
Patient:  Mami Estrada YOB: 1955 Date of Visit: 1/15/2019 Anh Bundy, [address] [facility] [address] [place] [id number] VIA Facsimile: [phone] 
 : 
Dear Nigel Olson, Thank you for referring Ms. Mony Chavez to me for evaluation/treatment. Below are the relevant portions of my assessment and plan of care. Ms. Mony Chavez is a 69 yo F with h/o hypothyroid, h/o shoulder surgery on 7/2011, question h/o MV prolapse. She is here due to palpitations. They occur once every two to three weeks lasting just a few seconds. She has had no associated lightheadedness, dizziness or syncope. Back in 2014, she had various cardiac evaluations, including a stress test and an echocardiogram that were normal.  She also had a Holter in 2012 that noted benign PVCs. She denies any exertional chest pain or shortness of breath. She walks every day, but wants to increase her level of activity. Incidentally, she also was recently found to have a small right ICA aneurysm and they are watching this and recommending conservative management. She is compensated on exam with clear lungs and no lower extremity edema. Her EKG is normal sinus rhythm, heart rate of 75, nonspecific ST-T wave abnormality. Fam Hx. Father 47 yo. Brother carotid dx age 72. Soc Hx. Works . No tobacco 
Assessment and Plan: 1. Palpitations. These are very infrequent and she has no associated symptoms with this. She had a Holter in the past with benign PVCs and current presentation consistent with this. If she does develop concurrent symptoms, would consider a repeat monitor. 2. Family history of early coronary artery disease. She is currently asymptomatic cardiac wise. No additional cardiac evaluation is indicated at this time. She did have questions about coronary calcium scoring and her brother did have significant heart disease.   With regards to optimizing primary prevention recommend regular followup with her primary care physician and risk factor management. With regard to coronary calcium scoring, she would not be excited to take a statin as is already on an aspirin a day. At least for now, I do not think there is any additional benefit to coronary calcium score for her. If you have questions, please do not hesitate to call me. Sincerely, Antonio Jacob MD

## 2019-02-14 ENCOUNTER — TELEPHONE (OUTPATIENT)
Dept: NEUROSURGERY | Age: 64
End: 2019-02-14

## 2019-02-14 NOTE — TELEPHONE ENCOUNTER
Called Ms. Read regarding her call into the office with headaches. She has been having ongoing mild headaches , particularly in on the right side. She admittedly has a lot of anxiety regarding her small ICA aneurysm. She watched a television show last night which depicted someone dying from a small aneurysm which ruptured. Today she is having increased anxiety. Assured patient that anxiety is common and normal. Headaches are ongoing and have not changed at all in character, frequency, duration, or level of pain. They are relieved by tylenol or aleve. She also states that she has not been able to go to massage therapy in recent months which was really helping with frequency of headaches. Encouraged her to go back. Asked her to call office next week if headaches were not improved or getting worse. Asked her to go to ER if she had sudden sharp pain, or \"thunderclap\" headache indicating worst headache of her life.      Yvonne Gibbons   Neurointerventional Surgery

## 2019-06-07 ENCOUNTER — HOSPITAL ENCOUNTER (OUTPATIENT)
Dept: MRI IMAGING | Age: 64
Discharge: HOME | End: 2019-06-07
Attending: RADIOLOGY
Payer: MEDICARE

## 2019-06-07 DIAGNOSIS — I67.1 ANEURYSM OF INTRACRANIAL PORTION OF RIGHT INTERNAL CAROTID ARTERY: ICD-10-CM

## 2019-06-07 PROCEDURE — 70544 MR ANGIOGRAPHY HEAD W/O DYE: CPT

## 2019-06-18 ENCOUNTER — HOSPITAL ENCOUNTER (OUTPATIENT)
Dept: ULTRASOUND IMAGING | Age: 64
Discharge: HOME | End: 2019-06-18
Attending: GENERAL PRACTICE
Payer: MEDICARE

## 2019-06-18 DIAGNOSIS — R10.11 ABDOMINAL PAIN, RIGHT UPPER QUADRANT: ICD-10-CM

## 2019-06-18 DIAGNOSIS — R10.817 ABDOMINAL TENDERNESS, GENERALIZED: ICD-10-CM

## 2019-06-18 PROCEDURE — 76700 US EXAM ABDOM COMPLETE: CPT

## 2019-06-21 ENCOUNTER — OFFICE VISIT (OUTPATIENT)
Dept: NEUROSURGERY | Age: 64
End: 2019-06-21

## 2019-06-21 VITALS
OXYGEN SATURATION: 98 % | TEMPERATURE: 99 F | SYSTOLIC BLOOD PRESSURE: 126 MMHG | WEIGHT: 175 LBS | HEART RATE: 88 BPM | RESPIRATION RATE: 16 BRPM | HEIGHT: 67 IN | BODY MASS INDEX: 27.47 KG/M2 | DIASTOLIC BLOOD PRESSURE: 88 MMHG

## 2019-06-21 DIAGNOSIS — I67.1 ANEURYSM OF INTRACRANIAL PORTION OF RIGHT INTERNAL CAROTID ARTERY: Primary | ICD-10-CM

## 2019-06-21 NOTE — PROGRESS NOTES
Neurointerventional Surgery  Ambulatory Progress Note      Patient: Kristen Kellogg MRN: [id number]  SSN: [id number]    YOB: 1955  Age: 75 y.o. Sex: female      History of Present Illness:      Maday Gloria presents today for clinical followup and review of recent MRA imaging results. Since her last visit, she reports generally doing well. She does experience intermittent headaches that usually \"start in the back and work their way to the top and occasionally the side\". These are typically associated with stress. She is the primary caregiver for her , a stroke survivor, with deficits. She does not endorse any focal neurological deficits since her last visit. Pulsatile tinnitus has resolved. 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 Objective:     Current Outpatient Medications   Medication Sig Dispense Refill    ibuprofen (ADVIL) 200 mg tablet Take 200 mg by mouth as needed for Pain.  azithromycin (ZITHROMAX) 250 mg tablet Take 250 mg by mouth daily.  OTHER Estrogen Compound 1 capsule daily      metroNIDAZOLE (METROCREAM) 0.75 % topical cream Apply  to affected area daily. Use a thin layer to affected areas after washing      levothyroxine (SYNTHROID) 75 mcg tablet Take  by mouth Daily (before breakfast).  escitalopram (LEXAPRO) 20 mg tablet Take 20 mg by mouth daily.  testosterone (ANDROGEL) 50 mg/5 gram (1 %) gel 5 g by TransDERmal route daily.             Visit Vitals  /88 (BP 1 Location: Left arm)   Pulse 88   Temp 99 °F (37.2 °C)   Resp 16   Ht 5' 7\" (1.702 m)   Wt 175 lb (79.4 kg)   SpO2 98%   BMI 27.41 kg/m²       Allergies   Allergen Reactions    Bactrim [Sulfamethoprim Ds] Unknown (comments)    Codeine Nausea Only    Morphine Itching    Phenergan Fortis Other (comments)     Parkinson like symptoms    Ultram [Tramadol] Nausea Only       Current Outpatient Medications   Medication Sig    ibuprofen (ADVIL) 200 mg tablet Take 200 mg by mouth as needed for Pain.  azithromycin (ZITHROMAX) 250 mg tablet Take 250 mg by mouth daily.  OTHER Estrogen Compound 1 capsule daily    metroNIDAZOLE (METROCREAM) 0.75 % topical cream Apply  to affected area daily. Use a thin layer to affected areas after washing    levothyroxine (SYNTHROID) 75 mcg tablet Take  by mouth Daily (before breakfast).  escitalopram (LEXAPRO) 20 mg tablet Take 20 mg by mouth daily.  testosterone (ANDROGEL) 50 mg/5 gram (1 %) gel 5 g by TransDERmal route daily. No current facility-administered medications for this visit. Physical Exam:  General: NAD, normal affect. In good spirits today. Neurologic Exam:  Mental Status:  Alert and oriented x 4. Appropriate affect, mood and behavior. Language:    Normal fluency, repetition, comprehension and naming. Cranial Nerves:   Pupils equal, round and reactive to light. Visual fields full to confrontation. Extraocular movements intact. Facial sensation intact V1 - V3. Full facial strength, no asymmetry. Hearing intact bilaterally. No dysarthria. Tongue protrudes to midline, palate elevates symmetrically. Shoulder shrug 5/5 bilaterally. Motor:    No pronator drift. Bulk and tone normal.      5/5 power in all extremities proximally and distally. No involuntary movements. Sensation:    Sensation intact throughout to light touch. Coordination & Gait: Normal, tandem gait normal, FTN and HTS intact.         Labs:  Lab Results   Component Value Date/Time    Sodium 141 12/21/2017 10:21 AM    Potassium 4.0 12/21/2017 10:21 AM    Chloride 107 12/21/2017 10:21 AM    CO2 29 12/21/2017 10:21 AM    Anion gap 5 12/21/2017 10:21 AM    Glucose 91 12/21/2017 10:21 AM    BUN 17 12/21/2017 10:21 AM    Creatinine 0.71 12/21/2017 10:21 AM    BUN/Creatinine ratio 24 (H) 12/21/2017 10:21 AM    GFR est AA >60 12/21/2017 10:21 AM    GFR est non-AA >60 12/21/2017 10:21 AM    Calcium 8.6 12/21/2017 10:21 AM     Lab Results   Component Value Date/Time    WBC 5.1 12/21/2017 10:21 AM    HGB 12.5 12/21/2017 10:21 AM    HCT 38.4 12/21/2017 10:21 AM    PLATELET 915 60/94/2075 10:21 AM    MCV 91.0 12/21/2017 10:21 AM     Lab Results   Component Value Date/Time    MCH 29.6 12/21/2017 10:21 AM    MCHC 32.6 12/21/2017 10:21 AM    BASOPHILS 0 12/21/2017 10:21 AM    ABS. LYMPHOCYTES 1.1 12/21/2017 10:21 AM    ABS. MONOCYTES 0.3 12/21/2017 10:21 AM    ABS. EOSINOPHILS 0.1 12/21/2017 10:21 AM    ABS. BASOPHILS 0.0 12/21/2017 10:21 AM       IMAGING:    I independently reviewed the recent MRA obtained 6/7/2019. This demonstrates a small (<3 mm) right internal carotid artery aneurysm directed posterior medially from the clinoid segment either inferior or just at the level of the dural ring. There is no interval change. Assessment/Plan:       ICD-10-CM ICD-9-CM    1. 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 No treatment is recommended. - Annual surveillance MRA scheduled in June 2020. I will see for clinical followup after the study is completed. - BP goal <120/80     - pulsatile tinnitus resolved. - patient given opportunity to review images and ask questions, with information provided. Follow-up Disposition:    I have discussed the diagnosis with the patient and the intended plan as seen in the above orders. Patient is in agreement. The patient has received an after-visit summary and questions were answered concerning future plans. I have discussed medication side effects and warnings with the patient as well.       Jones Dawn MD

## 2019-06-21 NOTE — PROGRESS NOTES
Follow up 6 months. Patient reports headaches from neck pain at times. Stated it has nothing to do with aneurysm. Patient denies dizziness, visual problems, nausea, photosensitivity. No acute problems reported.

## 2019-06-21 NOTE — PATIENT INSTRUCTIONS
Your small right carotid brain aneurysm is stable. No treatment is required. Good job on maintaining a normal blood pressure! This reduces the risk of aneurysm growth. Your long-term blood pressure goal is < 120/80. I recommend an 81 mg aspirin daily. MRA is scheduled in 1 year. Please schedule an appointment for clinical followup after the study is completed in 2020.

## 2020-01-22 ENCOUNTER — HOSPITAL ENCOUNTER (OUTPATIENT)
Dept: GENERAL RADIOLOGY | Age: 65
Discharge: HOME | End: 2020-01-22
Attending: INTERNAL MEDICINE
Payer: MEDICARE

## 2020-01-22 DIAGNOSIS — R05.9 COUGH: ICD-10-CM

## 2020-01-22 PROCEDURE — 71046 X-RAY EXAM CHEST 2 VIEWS: CPT

## 2020-01-28 ENCOUNTER — OFFICE VISIT (OUTPATIENT)
Dept: CARDIOLOGY CLINIC | Age: 65
End: 2020-01-28

## 2020-01-28 VITALS
DIASTOLIC BLOOD PRESSURE: 84 MMHG | HEIGHT: 67 IN | BODY MASS INDEX: 26.84 KG/M2 | WEIGHT: 171 LBS | HEART RATE: 90 BPM | RESPIRATION RATE: 16 BRPM | SYSTOLIC BLOOD PRESSURE: 136 MMHG | OXYGEN SATURATION: 98 %

## 2020-01-28 DIAGNOSIS — R94.31 ABNORMAL EKG: ICD-10-CM

## 2020-01-28 DIAGNOSIS — I20.0 UNSTABLE ANGINA (HCC): Primary | ICD-10-CM

## 2020-01-28 DIAGNOSIS — E78.5 BORDERLINE HYPERLIPIDEMIA: ICD-10-CM

## 2020-01-28 DIAGNOSIS — Z82.49 FAMILY HISTORY OF EARLY CAD: ICD-10-CM

## 2020-01-28 DIAGNOSIS — R07.89 OTHER CHEST PAIN: ICD-10-CM

## 2020-01-28 NOTE — PATIENT INSTRUCTIONS
You will be scheduled for a Stress Echo after your appointment today. Please wear comfortable clothing (shorts or pants with a shirt or blouse) and walking/athletic shoes. Do not eat or drink anything, except water, for at least 2 hours prior to your test. 
 
Do take your scheduled medications prior to your test. 
 
 
Mia Black [phone]

## 2020-01-28 NOTE — PROGRESS NOTES
1. Have you been to the ER, urgent care clinic since your last visit? Hospitalized since your last visit? Yes When: 1/27/2020 Where: [facility] Reason for visit: Chest Tightness    2. Have you seen or consulted any other health care providers outside of the [address] [place] [address] since your last visit? Include any pap smears or colon screening. No     Chief Complaint   Patient presents with    Chest Pain (Angina)    Shortness of Breath    Palpitations     Patient reports to office with EKG.     Visit Vitals  /84 (BP 1 Location: Left arm, BP Patient Position: Sitting)   Pulse 90   Resp 16   Ht 5' 7\" (1.702 m)   Wt 171 lb (77.6 kg)   SpO2 98%   BMI 26.78 kg/m²

## 2020-01-28 NOTE — PROGRESS NOTES
[facility] [place]: [facility]  [phone]    HPI:  Ms. Rivas Calderón is a 76 yo F with h/o hypothyroid, h/o shoulder surgery on 7/2011, question h/o MV prolapse. 2014 stress test and an echo was normal.  Holter in 2012 that noted benign PVCs. She is here now due to recent chest pain. It has been occurring almost daily for the last month, is substernal, can last for minutes to hours, happens at rest or exertion. This can happen with no particular triggers, no particular exacerbating factors. Overall, she thinks her breathing has been okay. She has had more palpitations. She did see pulmonary, Dr. Corey Armendariz and says that workup there was unrevealing. She also was seen at [facility] yesterday and chest x-ray was overall normal.  Of note, she has had a cough on and off since last October and feels like she has \"phlegm\" all the time. Her cough is not always productive. She does admit to nasal drainage [place] She is compensated on exam with clear lungs and no lower extremity edema. I personally reviewed her EKG from 01/27/2020 and this was normal sinus rhythm. There was nonspecific ST-T wave abnormality with T-wave inversions in the inferior leads and discussed findings with pt. Fam Hx. Father 49 yo. Brother carotid dx age 72. Soc Hx. Works . No tobacco  Assessment and Plan:    1. Unstable angina. Chest pain with typical and atypical features, risk factors, and abnormal EKG; will proceed with a treadmill stress echocardiogram for further evaluation. If this is unrevealing, would consider musculoskeletal etiology with her chronic cough. She might benefit from seeing an allergist.  She also wanted recommendation for a primary care physician and gave this. 2. Palpitations. Holter in the past noted benign PVCs. Overall, her palpitations feel the same. 3. Family history of early coronary artery disease.   We did talk in the past about considering coronary calcium scoring. She does not want to take preventative therapies in terms of medication, as she is holistic and so I am not sure that there is any benefit to this. She  has a past medical history of Arthritis, Cervical ca ([address] Hyperlipidemia, Hypothyroid, Leg swelling, Psychiatric disorder, Skipped beats, and Snoring. She also has no past medical history of Diabetes ([address] or Essential hypertension. All other systems negative except as above. PE  Vitals:    01/28/20 1454   BP: 136/84   Pulse: 90   Resp: 16   SpO2: 98%   Weight: 171 lb (77.6 kg)   Height: 5' 7\" (1.702 m)    Body mass index is 26.78 kg/m².    General appearance - alert, well appearing, and in no distress  Mental status - affect appropriate to mood  Neck - supple, no JVD  Chest - clear to auscultation, no wheezes, rales or rhonchi  Heart - normal rate, regular rhythm, normal S1, S2, no murmurs, rubs, clicks or gallops  Abdomen - soft, nontender, nondistended  Extremities - peripheral pulses normal, no pedal edema    Recent Labs:  Lab Results   Component Value Date/Time    Cholesterol, total 167 07/06/2009 03:35 AM    HDL Cholesterol 55 07/06/2009 03:35 AM    LDL, calculated 100.8 (H) 07/06/2009 03:35 AM    Triglyceride 56 07/06/2009 03:35 AM    CHOL/HDL Ratio 3.0 07/06/2009 03:35 AM     Lab Results   Component Value Date/Time    Creatinine 0.71 12/21/2017 10:21 AM     Lab Results   Component Value Date/Time    BUN 17 12/21/2017 10:21 AM     Lab Results   Component Value Date/Time    Potassium 4.0 12/21/2017 10:21 AM     No results found for: HBA1C, [id number]  Lab Results   Component Value Date/Time    HGB 12.5 12/21/2017 10:21 AM     Lab Results   Component Value Date/Time    PLATELET 836 44/53/7981 10:21 AM       Reviewed:  Past Medical History:   Diagnosis Date    Arthritis     Cervical ca (HCC)     Hyperlipidemia     Hypothyroid     Leg swelling     Psychiatric disorder     depression    Skipped beats     Snoring      Social History     Tobacco Use   Smoking Status Never Smoker   Smokeless Tobacco Never Used     Social History     Substance and Sexual Activity   Alcohol Use Yes    Comment: once a month glass of wine     Allergies   Allergen Reactions    Bactrim [Sulfamethoprim Ds] Unknown (comments)    Codeine Nausea Only    Morphine Itching    Phenergan Fortis Other (comments)     Parkinson like symptoms    Sulfa (Sulfonamide Antibiotics) Unknown (comments)    Ultram [Tramadol] Nausea Only       Current Outpatient Medications   Medication Sig    levothyroxine (SYNTHROID) 75 mcg tablet Take  by mouth Daily (before breakfast). Patient reports taking 12.5 mcg    ibuprofen (ADVIL) 200 mg tablet Take 200 mg by mouth as needed for Pain.  azithromycin (ZITHROMAX) 250 mg tablet Take 250 mg by mouth daily.  OTHER Estrogen Compound 1 capsule daily    metroNIDAZOLE (METROCREAM) 0.75 % topical cream Apply  to affected area daily. Use a thin layer to affected areas after washing    escitalopram (LEXAPRO) 20 mg tablet Take 20 mg by mouth daily.  testosterone (ANDROGEL) 50 mg/5 gram (1 %) gel 5 g by TransDERmal route daily. No current facility-administered medications for this visit.         MD Nguyen Lopez [facility]  [address], [address] [facility] [address]  [address] [facility], [address] [place] [address]

## 2020-02-10 ENCOUNTER — TELEPHONE (OUTPATIENT)
Dept: CARDIOLOGY CLINIC | Age: 65
End: 2020-02-10

## 2020-02-11 ENCOUNTER — PATIENT MESSAGE (OUTPATIENT)
Dept: CARDIOLOGY CLINIC | Age: 65
End: 2020-02-11

## 2020-02-12 ENCOUNTER — TELEPHONE (OUTPATIENT)
Dept: CARDIOLOGY CLINIC | Age: 65
End: 2020-02-12

## 2020-02-12 NOTE — TELEPHONE ENCOUNTER
Returned call to patient. Two patient indentifiers verified. Pt was informed of test results. Pt verbalized understanding and denies any further questions at this time.

## 2020-03-30 ENCOUNTER — TELEPHONE (OUTPATIENT)
Dept: NEUROSURGERY | Age: 65
End: 2020-03-30

## 2020-03-30 NOTE — TELEPHONE ENCOUNTER
Patient called to report mild right temporal headache that has been present for 4-5 days. She denies focal neuro changes, photophobia, meningismus, severe pain or sudden onset after extensive questioning. Patient advised to go to ED for any of the above symptoms or focal stroke symptoms. Patient stated \"I don't want to go to the ER because of coronavirus\". Small MARGUERITE paraclinoid aneurysm has an annual cumulative risk of rupture < 1%. The aneurysm has been stable in surveillance since 2018. She is due for repeat imaging in June 2020.     EVANGELINA

## 2020-08-19 ENCOUNTER — TELEPHONE (OUTPATIENT)
Dept: NEUROSURGERY | Age: 65
End: 2020-08-19

## 2020-08-19 ENCOUNTER — HOSPITAL ENCOUNTER (EMERGENCY)
Age: 65
Discharge: HOME | End: 2020-08-19
Attending: EMERGENCY MEDICINE | Admitting: EMERGENCY MEDICINE
Payer: MEDICARE

## 2020-08-19 ENCOUNTER — APPOINTMENT (OUTPATIENT)
Dept: CT IMAGING | Age: 65
End: 2020-08-19
Attending: EMERGENCY MEDICINE
Payer: MEDICARE

## 2020-08-19 VITALS
HEART RATE: 87 BPM | SYSTOLIC BLOOD PRESSURE: 127 MMHG | BODY MASS INDEX: 26.73 KG/M2 | TEMPERATURE: 98.3 F | HEIGHT: 68 IN | OXYGEN SATURATION: 98 % | RESPIRATION RATE: 18 BRPM | WEIGHT: 176.37 LBS | DIASTOLIC BLOOD PRESSURE: 89 MMHG

## 2020-08-19 DIAGNOSIS — I67.1 ANEURYSM OF INTRACRANIAL PORTION OF RIGHT INTERNAL CAROTID ARTERY: Primary | ICD-10-CM

## 2020-08-19 DIAGNOSIS — G43.109 OCULAR MIGRAINE: Primary | ICD-10-CM

## 2020-08-19 LAB
ANION GAP SERPL CALC-SCNC: 7 MMOL/L (ref 5–15)
BUN SERPL-MCNC: 21 MG/DL (ref 6–20)
BUN/CREAT SERPL: 23 (ref 12–20)
CALCIUM SERPL-MCNC: 9.4 MG/DL (ref 8.5–10.1)
CHLORIDE SERPL-SCNC: 109 MMOL/L (ref 97–108)
CO2 SERPL-SCNC: 26 MMOL/L (ref 21–32)
COMMENT, HOLDF: NORMAL
CREAT SERPL-MCNC: 0.93 MG/DL (ref 0.55–1.02)
GLUCOSE SERPL-MCNC: 140 MG/DL (ref 65–100)
POTASSIUM SERPL-SCNC: 3.6 MMOL/L (ref 3.5–5.1)
SAMPLES BEING HELD,HOLD: NORMAL
SODIUM SERPL-SCNC: 142 MMOL/L (ref 136–145)

## 2020-08-19 PROCEDURE — 70496 CT ANGIOGRAPHY HEAD: CPT

## 2020-08-19 PROCEDURE — 80048 BASIC METABOLIC PNL TOTAL CA: CPT

## 2020-08-19 PROCEDURE — 74011250636 HC RX REV CODE- 250/636: Performed by: EMERGENCY MEDICINE

## 2020-08-19 PROCEDURE — 36415 COLL VENOUS BLD VENIPUNCTURE: CPT

## 2020-08-19 PROCEDURE — 70450 CT HEAD/BRAIN W/O DYE: CPT

## 2020-08-19 PROCEDURE — 74011000258 HC RX REV CODE- 258: Performed by: RADIOLOGY

## 2020-08-19 PROCEDURE — 74011636320 HC RX REV CODE- 636/320: Performed by: RADIOLOGY

## 2020-08-19 PROCEDURE — 99283 EMERGENCY DEPT VISIT LOW MDM: CPT

## 2020-08-19 RX ORDER — SODIUM CHLORIDE 0.9 % (FLUSH) 0.9 %
10 SYRINGE (ML) INJECTION
Status: COMPLETED | OUTPATIENT
Start: 2020-08-19 | End: 2020-08-19

## 2020-08-19 RX ADMIN — SODIUM CHLORIDE 1000 ML: 9 INJECTION, SOLUTION INTRAVENOUS at 14:46

## 2020-08-19 RX ADMIN — SODIUM CHLORIDE 100 ML: 900 INJECTION, SOLUTION INTRAVENOUS at 14:46

## 2020-08-19 RX ADMIN — Medication 10 ML: at 14:46

## 2020-08-19 RX ADMIN — IOPAMIDOL 100 ML: 755 INJECTION, SOLUTION INTRAVENOUS at 14:46

## 2020-08-19 NOTE — ED PROVIDER NOTES
Pt presents with \"ocular migraine\". Started this morning upon waking.  + visual stigmata in R field of vision. Visual stigmata resolved after about 20 minutes. Still having mild pain on R side. + nausea but no vomiting. Denies any focal weakness or numbness at this time. Took advil this morning with no sig improvement.              Past Medical History:   Diagnosis Date    Arthritis     Cervical ca ([address]     Hyperlipidemia     Hypothyroid     Leg swelling     Psychiatric disorder     depression    Skipped beats     Snoring        Past Surgical History:   Procedure Laterality Date    HX HYSTERECTOMY      HX ORTHOPAEDIC      R thumb joint replacement    HX ORTHOPAEDIC      L thumb         Family History:   Problem Relation Age of Onset    Heart Disease Mother     Pulmonary Fibrosis Mother     Heart Disease Father     Stroke Brother        Social History     Socioeconomic History    Marital status: SINGLE     Spouse name: Not on file    Number of children: Not on file    Years of education: Not on file    Highest education level: Not on file   Occupational History    Not on file   Social Needs    Financial resource strain: Not on file    Food insecurity     Worry: Not on file     Inability: Not on file    Transportation needs     Medical: Not on file     Non-medical: Not on file   Tobacco Use    Smoking status: Never Smoker    Smokeless tobacco: Never Used   Substance and Sexual Activity    Alcohol use: Yes     Comment: once a month glass of wine    Drug use: No    Sexual activity: Not on file   Lifestyle    Physical activity     Days per week: Not on file     Minutes per session: Not on file    Stress: Not on file   Relationships    Social connections     Talks on phone: Not on file     Gets together: Not on file     Attends Mosque service: Not on file     Active member of club or organization: Not on file     Attends meetings of clubs or organizations: Not on file Relationship status: Not on file    Intimate partner violence     Fear of current or ex partner: Not on file     Emotionally abused: Not on file     Physically abused: Not on file     Forced sexual activity: Not on file   Other Topics Concern    Not on file   Social History Narrative    Not on file         ALLERGIES: Bactrim [sulfamethoprim ds]; Codeine; Morphine; Phenergan fortis; Sulfa (sulfonamide antibiotics); and Ultram [tramadol]    Review of Systems   Constitutional: Negative for fever. HENT: Negative for facial swelling. Eyes: Negative for visual disturbance. Respiratory: Negative for chest tightness. Cardiovascular: Negative for chest pain. Gastrointestinal: Negative for abdominal pain. Genitourinary: Negative for difficulty urinating and dysuria. Musculoskeletal: Negative for arthralgias. Skin: Negative for rash. Neurological: Positive for headaches. Hematological: Negative for adenopathy. Psychiatric/Behavioral: Negative for suicidal ideas. Vitals:    08/19/20 1412   BP: 136/84   Pulse: (!) 103   Resp: 22   Temp: 98.4 °F (36.9 °C)   SpO2: 98%   Weight: 80 kg (176 lb 5.9 oz)   Height: 5' 7.5\" (1.715 m)            Physical Exam  Vitals signs and nursing note reviewed. Constitutional:       General: She is not in acute distress. Appearance: She is well-developed. HENT:      Head: Normocephalic and atraumatic. Eyes:      General: No scleral icterus. Conjunctiva/sclera: Conjunctivae normal.      Pupils: Pupils are equal, round, and reactive to light. Neck:      Musculoskeletal: Normal range of motion and neck supple. Cardiovascular:      Rate and Rhythm: Normal rate. Heart sounds: No murmur. Pulmonary:      Effort: Pulmonary effort is normal. No respiratory distress. Abdominal:      General: There is no distension. Musculoskeletal: Normal range of motion. Skin:     General: Skin is warm and dry. Findings: No rash.    Neurological: General: No focal deficit present. Mental Status: She is alert and oriented to person, place, and time. Mental status is at baseline. Cranial Nerves: No cranial nerve deficit. Sensory: No sensory deficit. Motor: No weakness. Coordination: Coordination normal.      Gait: Gait normal.          MDM  Number of Diagnoses or Management Options  Ocular migraine:   Diagnosis management comments: Assessment: CTA of the head showed no change in patient's small aneurysm. No evidence of any other dangerous etiology for her headache. Stable for discharge home and follow-up with primary care doctor as needed. Return to the ED with any worsening symptoms.        Amount and/or Complexity of Data Reviewed  Clinical lab tests: reviewed  Tests in the radiology section of CPT®: reviewed           Procedures

## 2020-08-19 NOTE — TELEPHONE ENCOUNTER
Received a call from patient reporting she is experiencing an ocular migraine this morning. Reports she has a dull headache at her right temple and has not taken anything for the headache. Denies any visual changes, dizziness, n/v, sensitivity to light, throbbing or pulsing head pain. Informed patient she was due for her annual office visit and would need a MRA prior to appointment. Patient stated she has not had imaging done yet. Informed patient I would notify provider and return the call. In the mean time if she lost her vision or had sharp stabbing head pain, go to nearest ED. Patient stated understanding. Spoke to provider and informed him of the above. Patient has not seen neurology since 2018. Recommendation from provider to see neurology for ocular migraines and have imaging and follow up appointment scheduled. Seek urgent care if needed. Patient stated understanding. Number for Neurology and [address] given to patient.

## 2020-08-19 NOTE — ED TRIAGE NOTES
Triage Note: Patient is coming in with headache behind the right eye that started this morning. No relief with Motrin and is now having some nausea.  Patient has small aneurysm to the right side and talked to Dr. John Burch office today and he was not alarmed but states is pain persists or nausea begins to come to the ER>

## 2020-08-24 ENCOUNTER — TELEPHONE (OUTPATIENT)
Dept: NEUROSURGERY | Age: 65
End: 2020-08-24

## 2020-08-24 NOTE — TELEPHONE ENCOUNTER
Patient called in and would like to know if she should have MRA completed on 8/27/2020 . Patient went to ED and had CTA completed 8/19/2020.  She is a patient of Dr. Sharron Camara

## 2020-09-16 ENCOUNTER — TELEPHONE (OUTPATIENT)
Dept: NEUROSURGERY | Age: 65
End: 2020-09-16

## 2021-03-30 ENCOUNTER — APPOINTMENT (OUTPATIENT)
Dept: MRI IMAGING | Age: 66
End: 2021-03-30
Attending: EMERGENCY MEDICINE
Payer: MEDICARE

## 2021-03-30 ENCOUNTER — HOSPITAL ENCOUNTER (EMERGENCY)
Age: 66
Discharge: HOME | End: 2021-03-30
Attending: EMERGENCY MEDICINE | Admitting: EMERGENCY MEDICINE
Payer: MEDICARE

## 2021-03-30 ENCOUNTER — APPOINTMENT (OUTPATIENT)
Dept: CT IMAGING | Age: 66
End: 2021-03-30
Attending: EMERGENCY MEDICINE
Payer: MEDICARE

## 2021-03-30 VITALS
DIASTOLIC BLOOD PRESSURE: 88 MMHG | RESPIRATION RATE: 13 BRPM | TEMPERATURE: 98.1 F | HEART RATE: 80 BPM | OXYGEN SATURATION: 98 % | SYSTOLIC BLOOD PRESSURE: 147 MMHG

## 2021-03-30 DIAGNOSIS — R20.0 LEFT FACIAL NUMBNESS: Primary | ICD-10-CM

## 2021-03-30 LAB
ALBUMIN SERPL-MCNC: 4.1 G/DL (ref 3.5–5)
ALBUMIN/GLOB SERPL: 1.4 {RATIO} (ref 1.1–2.2)
ALP SERPL-CCNC: 78 U/L (ref 45–117)
ALT SERPL-CCNC: 22 U/L (ref 12–78)
ANION GAP SERPL CALC-SCNC: 6 MMOL/L (ref 5–15)
AST SERPL-CCNC: 13 U/L (ref 15–37)
ATRIAL RATE: 81 BPM
BASOPHILS # BLD: 0.1 K/UL (ref 0–0.1)
BASOPHILS NFR BLD: 1 % (ref 0–1)
BILIRUB SERPL-MCNC: 0.3 MG/DL (ref 0.2–1)
BUN SERPL-MCNC: 18 MG/DL (ref 6–20)
BUN/CREAT SERPL: 27 (ref 12–20)
CALCIUM SERPL-MCNC: 8.9 MG/DL (ref 8.5–10.1)
CALCULATED P AXIS, ECG09: 64 DEGREES
CALCULATED R AXIS, ECG10: 45 DEGREES
CALCULATED T AXIS, ECG11: 2 DEGREES
CHLORIDE SERPL-SCNC: 108 MMOL/L (ref 97–108)
CO2 SERPL-SCNC: 27 MMOL/L (ref 21–32)
CREAT SERPL-MCNC: 0.66 MG/DL (ref 0.55–1.02)
DIAGNOSIS, 93000: NORMAL
DIFFERENTIAL METHOD BLD: ABNORMAL
EOSINOPHIL # BLD: 0.1 K/UL (ref 0–0.4)
EOSINOPHIL NFR BLD: 2 % (ref 0–7)
ERYTHROCYTE [DISTWIDTH] IN BLOOD BY AUTOMATED COUNT: 12.6 % (ref 11.5–14.5)
GLOBULIN SER CALC-MCNC: 3 G/DL (ref 2–4)
GLUCOSE BLD STRIP.AUTO-MCNC: 103 MG/DL (ref 65–100)
GLUCOSE SERPL-MCNC: 93 MG/DL (ref 65–100)
HCT VFR BLD AUTO: 39.8 % (ref 35–47)
HGB BLD-MCNC: 13.2 G/DL (ref 11.5–16)
IMM GRANULOCYTES # BLD AUTO: 0 K/UL (ref 0–0.04)
IMM GRANULOCYTES NFR BLD AUTO: 0 % (ref 0–0.5)
LYMPHOCYTES # BLD: 1.3 K/UL (ref 0.8–3.5)
LYMPHOCYTES NFR BLD: 19 % (ref 12–49)
MAGNESIUM SERPL-MCNC: 2.3 MG/DL (ref 1.6–2.4)
MCH RBC QN AUTO: 30 PG (ref 26–34)
MCHC RBC AUTO-ENTMCNC: 33.2 G/DL (ref 30–36.5)
MCV RBC AUTO: 90.5 FL (ref 80–99)
MONOCYTES # BLD: 0.4 K/UL (ref 0–1)
MONOCYTES NFR BLD: 6 % (ref 5–13)
NEUTS SEG # BLD: 4.9 K/UL (ref 1.8–8)
NEUTS SEG NFR BLD: 72 % (ref 32–75)
NRBC # BLD: 0 K/UL (ref 0–0.01)
NRBC BLD-RTO: 0 PER 100 WBC
P-R INTERVAL, ECG05: 156 MS
PLATELET # BLD AUTO: 235 K/UL (ref 150–400)
PMV BLD AUTO: 8.7 FL (ref 8.9–12.9)
POTASSIUM SERPL-SCNC: 3.9 MMOL/L (ref 3.5–5.1)
PROT SERPL-MCNC: 7.1 G/DL (ref 6.4–8.2)
Q-T INTERVAL, ECG07: 362 MS
QRS DURATION, ECG06: 82 MS
QTC CALCULATION (BEZET), ECG08: 420 MS
RBC # BLD AUTO: 4.4 M/UL (ref 3.8–5.2)
SERVICE CMNT-IMP: ABNORMAL
SODIUM SERPL-SCNC: 141 MMOL/L (ref 136–145)
VENTRICULAR RATE, ECG03: 81 BPM
WBC # BLD AUTO: 6.8 K/UL (ref 3.6–11)

## 2021-03-30 PROCEDURE — 74011000636 HC RX REV CODE- 636: Performed by: EMERGENCY MEDICINE

## 2021-03-30 PROCEDURE — 96365 THER/PROPH/DIAG IV INF INIT: CPT

## 2021-03-30 PROCEDURE — 93005 ELECTROCARDIOGRAM TRACING: CPT

## 2021-03-30 PROCEDURE — 70553 MRI BRAIN STEM W/O & W/DYE: CPT

## 2021-03-30 PROCEDURE — 83735 ASSAY OF MAGNESIUM: CPT

## 2021-03-30 PROCEDURE — A9575 INJ GADOTERATE MEGLUMI 0.1ML: HCPCS | Performed by: EMERGENCY MEDICINE

## 2021-03-30 PROCEDURE — 70496 CT ANGIOGRAPHY HEAD: CPT

## 2021-03-30 PROCEDURE — 74011250636 HC RX REV CODE- 250/636: Performed by: EMERGENCY MEDICINE

## 2021-03-30 PROCEDURE — 36415 COLL VENOUS BLD VENIPUNCTURE: CPT

## 2021-03-30 PROCEDURE — 82962 GLUCOSE BLOOD TEST: CPT

## 2021-03-30 PROCEDURE — 85025 COMPLETE CBC W/AUTO DIFF WBC: CPT

## 2021-03-30 PROCEDURE — 99285 EMERGENCY DEPT VISIT HI MDM: CPT

## 2021-03-30 PROCEDURE — 70450 CT HEAD/BRAIN W/O DYE: CPT

## 2021-03-30 PROCEDURE — 80053 COMPREHEN METABOLIC PANEL: CPT

## 2021-03-30 RX ORDER — GADOTERATE MEGLUMINE 376.9 MG/ML
15 INJECTION INTRAVENOUS
Status: COMPLETED | OUTPATIENT
Start: 2021-03-30 | End: 2021-03-30

## 2021-03-30 RX ORDER — MAGNESIUM SULFATE 1 G/100ML
1 INJECTION INTRAVENOUS ONCE
Status: COMPLETED | OUTPATIENT
Start: 2021-03-30 | End: 2021-03-30

## 2021-03-30 RX ORDER — SODIUM CHLORIDE 0.9 % (FLUSH) 0.9 %
10 SYRINGE (ML) INJECTION
Status: DISCONTINUED | OUTPATIENT
Start: 2021-03-30 | End: 2021-03-30 | Stop reason: HOSPADM

## 2021-03-30 RX ADMIN — IOPAMIDOL 100 ML: 755 INJECTION, SOLUTION INTRAVENOUS at 17:07

## 2021-03-30 RX ADMIN — SODIUM CHLORIDE 500 ML: 9 INJECTION, SOLUTION INTRAVENOUS at 17:41

## 2021-03-30 RX ADMIN — MAGNESIUM SULFATE HEPTAHYDRATE 1 G: 1 INJECTION, SOLUTION INTRAVENOUS at 15:53

## 2021-03-30 RX ADMIN — GADOTERATE MEGLUMINE 15 ML: 376.9 INJECTION INTRAVENOUS at 19:08

## 2021-03-30 NOTE — DISCHARGE INSTRUCTIONS
It is likely that your symptoms today were caused by a complex migraine as work-up has been reassuring thus far. Please follow closely with your primary care doctor for a fasting lipid panel and neurologist as discussed.

## 2021-03-30 NOTE — ED PROVIDER NOTES
Is a 57-year-old female with past medical history significant for ocular migraine, hyperlipidemia, hypothyroidism and arthritis who presents emergency department for evaluation of left facial numbness that started approximately 1 hour prior to arrival.  She states that she was coming home from the grocery store when she touched her face and realized that her left cheek area did not feel normal.  She denies any pain other than some chronic low back pain, fever, chills, rash, shortness of breath, chest pain, speech deficits, vision changes or ataxia. Denies any weakness. Patient denies any similar symptoms. No trauma  Pt does endorse a mild left sided headache.     She also notes that she is followed by a nsg for a very small aneurysm which she was told there was nothing to do about and would likely never be an issue           Past Medical History:   Diagnosis Date    Arthritis     Cervical ca ([facility] [address]     Hyperlipidemia     Hypothyroid     Leg swelling     Ocular migraine     Psychiatric disorder     depression    Skipped beats     Snoring        Past Surgical History:   Procedure Laterality Date    HX HYSTERECTOMY      HX ORTHOPAEDIC      R thumb joint replacement    HX ORTHOPAEDIC      L thumb         Family History:   Problem Relation Age of Onset    Heart Disease Mother     Pulmonary Fibrosis Mother     Heart Disease Father     Stroke Brother        Social History     Socioeconomic History    Marital status: SINGLE     Spouse name: Not on file    Number of children: Not on file    Years of education: Not on file    Highest education level: Not on file   Occupational History    Not on file   Social Needs    Financial resource strain: Not on file    Food insecurity     Worry: Not on file     Inability: Not on file    Transportation needs     Medical: Not on file     Non-medical: Not on file   Tobacco Use    Smoking status: Never Smoker    Smokeless tobacco: Never Used   Substance and Sexual Activity    Alcohol use: Yes     Comment: once a month glass of wine    Drug use: No    Sexual activity: Not on file   Lifestyle    Physical activity     Days per week: Not on file     Minutes per session: Not on file    Stress: Not on file   Relationships    Social connections     Talks on phone: Not on file     Gets together: Not on file     Attends Yazidi service: Not on file     Active member of club or organization: Not on file     Attends meetings of clubs or organizations: Not on file     Relationship status: Not on file    Intimate partner violence     Fear of current or ex partner: Not on file     Emotionally abused: Not on file     Physically abused: Not on file     Forced sexual activity: Not on file   Other Topics Concern    Not on file   Social History Narrative    Not on file         ALLERGIES: Bactrim [sulfamethoprim ds], Codeine, Morphine, Phenergan fortis, Sulfa (sulfonamide antibiotics), and Ultram [tramadol]    Review of Systems   Constitutional: Negative for chills, diaphoresis and fever. HENT: Negative for drooling, facial swelling, nosebleeds, trouble swallowing and voice change. Eyes: Negative for photophobia, pain and visual disturbance. Respiratory: Negative for shortness of breath. Cardiovascular: Negative for chest pain. Gastrointestinal: Negative for abdominal pain and vomiting. Musculoskeletal: Positive for back pain (Patient states that she was lifting something heavy and has some lower back pain but states that this is been a ongoing increasing thing for the past year or so.). Negative for neck pain and neck stiffness. Neurological: Positive for numbness (Left cheek area). Negative for facial asymmetry and headaches. Hematological: Does not bruise/bleed easily. Psychiatric/Behavioral: Negative. Vitals:    03/30/21 1456   Temp: 98.1 °F (36.7 °C)            Physical Exam  Vitals signs and nursing note reviewed.    Constitutional:       General: She is not in acute distress. Appearance: Normal appearance. She is not ill-appearing, toxic-appearing or diaphoretic. HENT:      Head: Normocephalic and atraumatic. Right Ear: External ear normal.      Left Ear: External ear normal.      Nose: Nose normal.      Mouth/Throat:      Mouth: Mucous membranes are moist.   Eyes:      General: No scleral icterus. Pupils: Pupils are equal, round, and reactive to light. Neck:      Musculoskeletal: Neck supple. Cardiovascular:      Rate and Rhythm: Normal rate and regular rhythm. Pulses: Normal pulses. Pulmonary:      Effort: Pulmonary effort is normal.      Breath sounds: Normal breath sounds. Skin:     General: Skin is warm and dry. Neurological:      Mental Status: She is alert and oriented to person, place, and time. Comments: NIHSS 1 for decreased sensation to light touch left cheek   Psychiatric:         Mood and Affect: Mood normal.         Behavior: Behavior normal.         Thought Content: Thought content normal.         Judgment: Judgment normal.          MDM  Number of Diagnoses or Management Options  Left facial numbness: new and requires workup  Diagnosis management comments: Imaging reassuring and case discussed at length with telemetry neurology. Appreciate their input. Work-up does not demonstrate an active stroke or vascular explanation. It appears most likely that she is having a complex migraine. She was advised to follow closely with neurology and her primary care doctor. NO new symptoms while in the ED       Amount and/or Complexity of Data Reviewed  Clinical lab tests: reviewed and ordered  Tests in the radiology section of CPT®: reviewed and ordered      ED Course as of Mar 30 2037   Prakash Becker Mar 30, 2021   1543 ECG obtained at 1532 showing normal sinus rhythm, rate 81, normal intervals, nonspecific T wave changes, unchanged compared to prior EKG. [JE]   9435 Case discussed with telemetry neurologist Dr. Roxane Betancourt.   Advised CTA head neck in addition to CT head without. Will evaluate patient via [facility] [place].     [JE]      ED Course User Index  [JE] Anupama Mayer MD       Procedures

## 2021-04-01 ENCOUNTER — OFFICE VISIT (OUTPATIENT)
Dept: NEUROLOGY | Age: 66
End: 2021-04-01
Payer: MEDICARE

## 2021-04-01 VITALS
OXYGEN SATURATION: 100 % | BODY MASS INDEX: 27.47 KG/M2 | HEART RATE: 71 BPM | SYSTOLIC BLOOD PRESSURE: 132 MMHG | RESPIRATION RATE: 16 BRPM | HEIGHT: 67 IN | WEIGHT: 175 LBS | DIASTOLIC BLOOD PRESSURE: 80 MMHG

## 2021-04-01 DIAGNOSIS — G43.809 MIGRAINE VARIANT WITH HEADACHE: Primary | ICD-10-CM

## 2021-04-01 DIAGNOSIS — M54.50 LUMBAR BACK PAIN: ICD-10-CM

## 2021-04-01 PROCEDURE — 99214 OFFICE O/P EST MOD 30 MIN: CPT | Performed by: NURSE PRACTITIONER

## 2021-04-01 PROCEDURE — 1090F PRES/ABSN URINE INCON ASSESS: CPT | Performed by: NURSE PRACTITIONER

## 2021-04-01 PROCEDURE — 1101F PT FALLS ASSESS-DOCD LE1/YR: CPT | Performed by: NURSE PRACTITIONER

## 2021-04-01 PROCEDURE — G8399 PT W/DXA RESULTS DOCUMENT: HCPCS | Performed by: NURSE PRACTITIONER

## 2021-04-01 PROCEDURE — G8536 NO DOC ELDER MAL SCRN: HCPCS | Performed by: NURSE PRACTITIONER

## 2021-04-01 PROCEDURE — 3017F COLORECTAL CA SCREEN DOC REV: CPT | Performed by: NURSE PRACTITIONER

## 2021-04-01 PROCEDURE — G8419 CALC BMI OUT NRM PARAM NOF/U: HCPCS | Performed by: NURSE PRACTITIONER

## 2021-04-01 PROCEDURE — G8432 DEP SCR NOT DOC, RNG: HCPCS | Performed by: NURSE PRACTITIONER

## 2021-04-01 PROCEDURE — G8427 DOCREV CUR MEDS BY ELIG CLIN: HCPCS | Performed by: NURSE PRACTITIONER

## 2021-04-01 NOTE — PROGRESS NOTES
patient states she feels a weird sensation on the left side of her nose but the rest of her face feels normal.    No other concerns at this time.     .  Chief Complaint   Patient presents with    Numbness     patient states she feels a weird sensation on the  left side of her nose but the rest of her face feels normal       .  Visit Vitals  /80 (BP 1 Location: Right arm, BP Patient Position: Sitting)   Pulse 71   Resp 16   Ht 5' 7\" (1.702 m)   Wt 175 lb (79.4 kg)   SpO2 100%   BMI 27.41 kg/m²

## 2021-04-01 NOTE — PROGRESS NOTES
Neurology Follow-up  Josette Julio NP      Visit Date: April 1, 2021    Patient: Yesenia Richard MRN: [id number]  SSN: [id number]    YOB: 1955  Age: 74 y.o. Sex: female      PCP: Dary Love DO      Previous records (physician notes, laboratory reports, and radiology reports) and imaging studies were reviewed and summarized. Subjective:     HPI: Yesenia Richard is a 74 y.o. female with past medical history significant for ocular migraine, hyperlipidemia, hypothyroidism, anxiety and arthritis presented to Dr Marcelle Diaz 9/25/18 to discuss some symptoms that she was having for the past 2-3 months. She has been had a mild headache mainly in the occipital head region and sometimes she will get a pulsating feeling in the back of her head that will continue for a few minutes and then subsides. Sometimes the pulsating sensation will make her entire head throb/move. It can happen multiple times a day. This does not feel like her typical ocular migraines. No changes in vision, speech or focal motor or sensory deficits. She was having these symptoms quite frequently and had to go to the emergency department. She had a CT brain which was negative. A/P Pt has had multiple symptoms including new daily persistent dull headache in the occipital head region along with the pulsating sensation that comes and goes. She has also been having ringing sounds in her ears bilaterally that are in sync with the heartbeat. CT brain was negative. MRI brain to rule out structural posterior fossa abnormality and also MRA to rule out aneurysm/vascular malformation     MRI Brain was normal. MRA showed small right ICA aneurysm, but was otherwise unremarkable. She follows with Dr Josee Alexander. The aneurysm has a very low annual cumulative risk of rupture and SAH (<1% per year). There are no further neurology visits. 3/30/21. ED visit for left facial numbness.  She reported that she was coming home from the grocery store when she touched her face and realized that her left cheek area did not feel normal. Reportedly no other symptoms, though she endorsed a mild left sided headache. NIHSS 1 for decreased sensation to light touch left cheek. MRI brain was unremarkable. CTA H&N was also unremarkable (The small aneurysm was not noted). Interval History:     Pt presents today, 04/01/21 for follow up of ED visit for facial numbness. Patient was driving home from the grocery store noticed area of numbness on the left side of the her nose and just below the orbit. She had a mild left sided aching headache at the time. Other than the numbness there were no visual or other symptoms associated with the episode. She had a stressful weekend, she has been having 10 out of 10 lumbar back pain and she has been having stomach upset. Further she borrowed from her savings this year and knows that she has a large tax bill this year but does not know how much and how she is going to pay for it. Her partner also had a stroke recently with significant deficits which is also a strain. The area described is much improved but not quite resolved. She woke up this morning with a generalized headache. As noted previously she has had an ocular migraine in the past about 5 years ago with a Sunburst scotoma. She had another one after that was less intense. She remembers being bedridden when she was a child for headache when she was 4years old. She also complains of generalized fatigue and has not been eating well.   She did not take anything for her headache, no OTC analgesics etc.      Review of systems  Review of systems negative except as detailed in the HPI, interval, PMH and A&P        Past Medical History:   Diagnosis Date    Arthritis     Cervical ca ([facility] [address]     Hyperlipidemia     Hypothyroid     Leg swelling     Ocular migraine     Psychiatric disorder     depression    Skipped beats     Snoring      Past Surgical History:   Procedure Laterality Date    HX HYSTERECTOMY      HX ORTHOPAEDIC      R thumb joint replacement    HX ORTHOPAEDIC      L thumb      Family History   Problem Relation Age of Onset    Heart Disease Mother     Pulmonary Fibrosis Mother     Heart Disease Father     Stroke Brother      Social History     Tobacco Use    Smoking status: Never Smoker    Smokeless tobacco: Never Used   Substance Use Topics    Alcohol use: Yes     Comment: once a month glass of wine           Allergies   Allergen Reactions    Bactrim [Sulfamethoprim Ds] Unknown (comments)    Codeine Nausea Only    Morphine Itching    Phenergan Fortis Other (comments)     Parkinson like symptoms    Sulfa (Sulfonamide Antibiotics) Unknown (comments)    Ultram [Tramadol] Nausea Only          Objective:      Visit Vitals  /80 (BP 1 Location: Right arm, BP Patient Position: Sitting)   Pulse 71   Resp 16   Ht 5' 7\" (1.702 m)   Wt 175 lb (79.4 kg)   SpO2 100%   BMI 27.41 kg/m²        General: No distress. Well groomed   Respiratory: Unlabored  Psych: Good mood and affect    Neurologic Exam:    Mental Status:  Oriented. Calm. Cooperative. Normal processing  Coherent conversation  Able to follow directions without difficulty     Language:    Normal fluency, comprehension    Cranial Nerves:   Facial activation symmetric     Hearing intact     No dysarthria        Motor:    No obvious lateralizing weakness     No involuntary movements. No obvious resting or intention tremor observed    Gait:   Normal      MMSE  No flowsheet data found.      PHQ  3 most recent PHQ Screens 4/1/2021   PHQ Not Done -   Little interest or pleasure in doing things Not at all   Feeling down, depressed, irritable, or hopeless Not at all   Total Score PHQ 2 0       Lab Results   Component Value Date/Time    WBC 6.8 03/30/2021 03:39 PM    HCT 39.8 03/30/2021 03:39 PM    HGB 13.2 03/30/2021 03:39 PM    PLATELET 574 28/91/9249 03:39 PM       Lab Results   Component Value Date/Time    Sodium 141 03/30/2021 03:39 PM    Potassium 3.9 03/30/2021 03:39 PM    Chloride 108 03/30/2021 03:39 PM    CO2 27 03/30/2021 03:39 PM    Glucose 93 03/30/2021 03:39 PM    BUN 18 03/30/2021 03:39 PM    Creatinine 0.66 03/30/2021 03:39 PM    Calcium 8.9 03/30/2021 03:39 PM       No components found for: TROPQUANT,  SGOT,  GPT,  ALT,  AP,  TBIL,  TBILI,  TP,  ALB,  GLOB,  GGT,  AML,  AMYP,  LPSE,  HLPSE    No results found for: SUSANNA    No results found for: HBA1C, HGBE8, GYB5RVBD, XDW2CVBC, OZB8OPLT     No results found for: B12LT, FOL, RBCF    No results found for: SUSANNA, Gloria Lidia, XBANA    Lab Results   Component Value Date/Time    Cholesterol, total 167 07/06/2009 03:35 AM    HDL Cholesterol 55 07/06/2009 03:35 AM    LDL, calculated 100.8 (H) 07/06/2009 03:35 AM    VLDL, calculated 11.2 07/06/2009 03:35 AM    Triglyceride 56 07/06/2009 03:35 AM    CHOL/HDL Ratio 3.0 07/06/2009 03:35 AM       XR Results   Results from [place] encounter on 01/22/20   XR CHEST PA LAT    Impression IMPRESSION:  1. Hyperinflated lungs with findings suggestive of emphysema. Clinical  correlation is suggested. Results from [place] encounter on 07/05/09   XR CHEST PA AND LATERAL       CT Results:  Results from Hospital Encounter encounter on 03/30/21   CTA HEAD NECK W CONT    Narrative *PRELIMINARY REPORT*    CTA was performed of the head and neck. Left vertebral artery arises directly off the arch. Common carotid bifurcations are patent. Vertebral arteries are patent. Intracranial circulation is patent without evidence of large vessel occlusion. There is a right posterior communicating artery. Previously mentioned tiny  distal right internal carotid artery aneurysm is less well visualized    Preliminary report was provided by Dr. Shelly Calvert, the on-call radiologist, at    Final report to follow.     *END PRELIMINARY REPORT*    CLINICAL HISTORY: Left facial numbness    EXAMINATION:  CT ANGIOGRAPHY HEAD AND NECK    COMPARISON: CT head August 2020    TECHNIQUE:  Following the uneventful administration of iodinated contrast  material, axial CT angiography of the head and neck was performed. Delayed axial  images through the head were also obtained. Coronal and sagittal reconstructions  were obtained. Manual postprocessing of images was performed. 3-D  Sagittal  maximal intensity projection images were obtained. 3-D Coronal maximal  intensity projections were obtained. CT dose reduction was achieved through use  of a standardized protocol tailored for this examination and automatic exposure  control for dose modulation. FINDINGS:    Delayed contrast-enhanced head CT:    The ventricles are midline without hydrocephalus. There is no acute intra or  extra-axial hemorrhage. Mild The basal cisterns are clear. The paranasal  sinuses are clear. CTA NECK:    Great vessels: Four-vessel aortic arch with patent origins. Right subclavian artery: Patent    Left subclavian artery: Patent     Right common carotid artery: Patent     Left common carotid artery: Patent     Cervical right internal carotid artery: Patent with no significant stenosis by  NASCET criteria. Cervical left internal carotid artery: Patent with no significant stenosis by  NASCET criteria. Right vertebral artery: Patent    Left vertebral artery: Arises directly off the aortic arch, patent. Biapical pleural/parenchymal scarring. Mosaic attenuation of the lung apices  indicative of small airways inflammation.  Thyroid gland is normal.    CTA HEAD:    Right cavernous internal carotid artery: Patent    Left cavernous internal carotid artery: Patent    Anterior cerebral arteries: Patent    Anterior communicating artery: Patent    Right middle cerebral artery: Patent    Left middle cerebral artery: Patent    Posterior communicating arteries: Patent    Posterior cerebral arteries: Patent    Basilar artery: Patent    Distal vertebral arteries: Patent    No evidence for intracranial aneurysm or hemodynamically significant stenosis. Impression 1. No evidence of large vessel occlusion or hemodynamically significant carotid  stenosis. 2.  Mosaic attenuation at the lung apices suggestive of small airways  inflammation. CT HEAD WO CONT    Narrative EXAM: CT HEAD WO CONT    INDICATION: left facial numbness    COMPARISON: 8/19/2020. CONTRAST: None. TECHNIQUE: Unenhanced CT of the head was performed using 5 mm images. Brain and  bone windows were generated. Coronal and sagittal reformats. CT dose reduction  was achieved through use of a standardized protocol tailored for this  examination and automatic exposure control for dose modulation. FINDINGS:  The ventricles and sulci are normal in size, shape and configuration. . There is  no significant white matter disease. There is no intracranial hemorrhage,  extra-axial collection, or mass effect. The basilar cisterns are open. No CT  evidence of acute infarct. Image quality is degraded by beam hardening artifact  in the posterior fossa. The bone windows demonstrate no abnormalities. The visualized portions of the  paranasal sinuses and mastoid air cells are clear. Impression No acute intracranial abnormality. Results from [place] encounter on 08/19/20   CT HEAD WO CONT    Narrative EXAM: CT HEAD WO CONT    INDICATION: Headache    COMPARISON: CT 11/26/2018, CT 9/20/2018, MRI 6/7/2019. CONTRAST: None. TECHNIQUE: Unenhanced CT of the head was performed using 5 mm images. Brain and  bone windows were generated. Coronal and sagittal reformats. CT dose reduction  was achieved through use of a standardized protocol tailored for this  examination and automatic exposure control for dose modulation. FINDINGS:  The ventricles and sulci are normal in size, shape and configuration. . There is  no significant white matter disease. There is no intracranial hemorrhage,  extra-axial collection, or mass effect. The basilar cisterns are open. No CT  evidence of acute infarct. The bone windows demonstrate no abnormalities. The visualized portions of the  paranasal sinuses and mastoid air cells are clear. Impression IMPRESSION:   No acute findings. MRI Results:  Results from [place] encounter on 03/30/21   MRI BRAIN W WO CONT    Narrative *PRELIMINARY REPORT*    MR of the brain demonstrates no acute abnormality. Preliminary report was provided by Dr. Trupti Ayon, the on-call radiologist, at [phone]    Final report to follow. *END PRELIMINARY REPORT*    EXAM:  MRI BRAIN W WO CONT    INDICATION:    left facial numbness    COMPARISON:  MRI brain 10/25/2018, CT head 3/30/2021. CONTRAST: 15 ml Dotarem. TECHNIQUE:    Multiplanar multisequence acquisition without and with contrast of the brain. FINDINGS:  The ventricles are normal in size and position. The brain parenchyma has normal  signal characteristics. There is no acute infarct, hemorrhage, extra-axial fluid  collection, or mass effect. There is no cerebellar tonsillar herniation. Expected arterial flow-voids are present. No evidence of abnormal enhancement. The paranasal sinuses, mastoid air cells, and middle ears are clear. The orbital  contents are within normal limits. No significant osseous or scalp lesions are  identified. Impression 1. No acute or significant intracranial abnormality. Results from [place] encounter on 06/07/19   MRA BRAIN WO CONT    Narrative CLINICAL HISTORY: Daily headache, pulsatile tinnitus. INDICATION: Aneurysm, head. COMPARISON: 10/17/2018  TECHNIQUE: 3-D time-of-flight MRA of the brain was performed. Multiplanar  reconstructions were obtained. FINDINGS:   The right vertebral artery is dominant. Posterior communicating artery on the  right.  Small inferiorly oriented aneurysm of the supraclinoid ICA on the right. May represent ophthalmic artery infundibulum. If present less than 3 mm in size  . Right and left A2 segments are without evidence of occlusion. A1 segments are  present. M1 segments are symmetric. M2 segments demonstrate symmetric  arborization. Dural venous sinuses visualized are grossly patent. . The basilar  artery and its branches are normal. The internal carotid, anterior cerebral, and  middle cerebral arteries are patent. There is no flow-limiting intracranial  stenosis. Impression IMPRESSION:    Possible small inferior oriented aneurysm of the supraclinoid ICA on the right. This can be confirmed with CTA of the head. Results from [place] encounter on 10/25/18   MRA BRAIN WO CONT    Narrative CLINICAL HISTORY: Daily headache, pulsatile tenderness    INDICATION: Daily HA, pulsatile tinnitus. COMPARISON: 10/17/2018  TECHNIQUE: MR examination of the brain includes axial and sagittal T1 , axial  T2, axial FLAIR, axial gradient echo, axial DWI, coronal T2. Contrast: The patient was administered gadolinium-based contrast material ,  axial and sagittal T1-weighted postcontrast enhanced imaging was obtained. Next, 3-D time-of-flight MRA of the brain was performed. Multiplanar  reconstructions were obtained. [facility] FINDINGS:   There is no intracranial mass, hemorrhage or evidence of acute infarction. Few scattered foci of increased T2 signal intensity are likely of no clinical  significance. Minimal sulcal and ventricular prominence. There is no abnormal parenchymal enhancement. There is no abnormal meningeal  enhancement. There is no Chiari or syrinx. The pituitary and infundibulum are  grossly unremarkable. There is no skull base mass. Cerebellopontine angles are  grossly unremarkable. The major intracranial vascular flow-voids are  unremarkable. The cavernous sinuses are symmetric. Optic chiasm and infundibulum grossly  unremarkable. Orbits are grossly symmetric. Dural venous sinuses are grossly  patent. The brain architecture is normal. There is no evidence of midline shift  or mass-effect. There are no extra-axial fluid collections. The mastoid air  cells and paranasal sinuses are well pneumatized and clear. The right vertebral artery is dominant. Posterior communicating artery on the  right. Small inferiorly oriented aneurysm of the supraclinoid ICA on the right. May represent ophthalmic artery infundibulum. Less than 3 mm in size. Right and  left A2 segments are without evidence of occlusion. A1 segments are present. M1  segments are symmetric. M2 segments themselves are symmetric arborization. Dural  venous sinuses visualized are grossly patent. . The basilar artery and its  branches are normal. The internal carotid, anterior cerebral, and middle  cerebral arteries are patent. There is no flow-limiting intracranial stenosis. Impression IMPRESSION:   Normal MRI of the brain. No intracranial mass, hemorrhage or evidence of acute infarction. Findings suggestive of small inferiorly oriented aneurysm of the supraclinoid  ICA on the right. This can be confirmed with CTA of the head. Aneurysm would  measure less than 3 mm in size. VAS/US Results (maximum last 3): No results found for this or any previous visit. TTE  02/10/20   ECHO STRESS 02/10/2020 2/12/2020    Narrative · Normal stress echocardiogram. Low risk study. · Low risk [facility] treadmill score.         Signed by: Brittany Nicole MD         EKG  Results for orders placed or performed during the hospital encounter of 03/30/21   EKG, 12 LEAD, INITIAL   Result Value Ref Range    Ventricular Rate 81 BPM    Atrial Rate 81 BPM    P-R Interval 156 ms    QRS Duration 82 ms    Q-T Interval 362 ms    QTC Calculation (Bezet) 420 ms    Calculated P Axis 64 degrees    Calculated R Axis 45 degrees    Calculated T Axis 2 degrees    Diagnosis       Normal sinus rhythm  T wave abnormality, consider inferolateral ischemia  When compared with ECG of 06-JUL-2009 07:11,  Nonspecific ST abnormality  Confirmed by Sita Marie M.D., Adalberto Trevizo ([id number]) on 3/30/2021 4:29:50 PM             Assessment/Plan:     Louana Severe is a 74 y.o. female with a history of rarely occurring migraines including 2 ocular migraines with stomata possible debilitating migraine as a child. She does get mild headaches sometimes and normally ignores them. None of her headaches have been particularly painful. She seems to be under a particularly high stress load at the moment and this may have triggered another migraine variant. CT and CT head and neck were unremarkable. The small right ICA aneurysm was not noted. MRI was negative and there were no other associated deficits. The numbness has nearly resolved. Likely a stress-induced migraine variant. Patient has a fair number of stressors occurring at the moment which are likely contributing to her generalized malaise and stomach upset. She also reports extreme lumbar back pain with occasional radiation on either side into the buttocks and at worst the top of the hips. No weakness. Brain imaging was negative. Her neurological exam is benign. The symptoms are resolving. Patient was relieved that it was nothing more serious. Discussed working on her stress management and working on getting some of the stresses behind her. We discussed trying PT for her back. She has done PT before and it was helpful also she is willing to give it another try. - Referral to PT for lumbar back pain  - Follow up as needed   - Instructed to call with questions or concerns. Visit Diagnoses    ICD-10-CM ICD-9-CM    1. Migraine variant with headache  G43.809 346.20    2.  Lumbar back pain  M54.5 724.2 REFERRAL TO PHYSICAL THERAPY           Signed By: Renee Ochoa NP     April 1, 2021 9:28 PM

## 2021-09-20 ENCOUNTER — HOSPITAL ENCOUNTER (EMERGENCY)
Age: 66
Discharge: HOME | End: 2021-09-20
Attending: EMERGENCY MEDICINE
Payer: MEDICARE

## 2021-09-20 ENCOUNTER — APPOINTMENT (OUTPATIENT)
Dept: CT IMAGING | Age: 66
End: 2021-09-20
Attending: EMERGENCY MEDICINE
Payer: MEDICARE

## 2021-09-20 ENCOUNTER — TELEPHONE (OUTPATIENT)
Dept: NEUROLOGY | Age: 66
End: 2021-09-20

## 2021-09-20 VITALS
HEART RATE: 97 BPM | TEMPERATURE: 97.6 F | SYSTOLIC BLOOD PRESSURE: 144 MMHG | RESPIRATION RATE: 16 BRPM | DIASTOLIC BLOOD PRESSURE: 86 MMHG | OXYGEN SATURATION: 97 %

## 2021-09-20 DIAGNOSIS — G44.89 OTHER HEADACHE SYNDROME: Primary | ICD-10-CM

## 2021-09-20 PROCEDURE — 70450 CT HEAD/BRAIN W/O DYE: CPT

## 2021-09-20 PROCEDURE — 99281 EMR DPT VST MAYX REQ PHY/QHP: CPT

## 2021-09-20 NOTE — TELEPHONE ENCOUNTER
Patient is experiencing right sided head pain from the temple to the jaw. .. also having numbness and tingling. She is wondering if she should have a new scan. This head pain started about two weeks ago. She has had intermittent numbness but the pain is new. She is very concerned and unable to get an appt with us or Doctor Karen Pederson to be seen ASAP. Please call.

## 2021-09-20 NOTE — TELEPHONE ENCOUNTER
I have reviewed this information with patient. She is concerned because symptoms have been present for two weeks. Patient opts to go to ED at this time.

## 2021-09-20 NOTE — TELEPHONE ENCOUNTER
Per Dr. Meliza Shen,   I have not seen her in almost 3 years but it appears that she has migraines and this may be just a migraine.  She had brain imaging done less than 6 months ago and this was normal.   However if she is concerned and experiencing new symptoms, best to go to ED

## 2021-09-23 NOTE — ED PROVIDER NOTES
Pt is a 75 yo female with hx of occipital migraine, hypothyroid, depression who presents with right sided headache. Seen in the ED earlier this year for facial numbness and seen by neurology for it. 2 week hx of headache, relieved by OTC NSAIDs but returns. Called neurology but was told next appt in Jan 2022. Pt had a previous bad reaction to phenergan/compazine and does not want any medication at this time. Past Medical History:   Diagnosis Date    Arthritis     Cervical ca ([address]     Hyperlipidemia     Hypothyroid     Leg swelling     Ocular migraine     Psychiatric disorder     depression    Skipped beats     Snoring        Past Surgical History:   Procedure Laterality Date    HX HYSTERECTOMY      HX ORTHOPAEDIC      R thumb joint replacement    HX ORTHOPAEDIC      L thumb         Family History:   Problem Relation Age of Onset    Heart Disease Mother     Pulmonary Fibrosis Mother     Heart Disease Father     Stroke Brother        Social History     Socioeconomic History    Marital status: SINGLE     Spouse name: Not on file    Number of children: Not on file    Years of education: Not on file    Highest education level: Not on file   Occupational History    Not on file   Tobacco Use    Smoking status: Never Smoker    Smokeless tobacco: Never Used   Substance and Sexual Activity    Alcohol use: Yes     Comment: once a month glass of wine    Drug use: No    Sexual activity: Not on file   Other Topics Concern    Not on file   Social History Narrative    Not on file     Social Determinants of Health     Financial Resource Strain:     Difficulty of Paying Living Expenses:    Food Insecurity:     Worried About Running Out of Food in the Last Year:     Ran Out of Food in the Last Year:    Transportation Needs:     Lack of Transportation (Medical):      Lack of Transportation (Non-Medical):    Physical Activity:     Days of Exercise per Week:     Minutes of Exercise per Session:    Stress:     Feeling of Stress :    Social Connections:     Frequency of Communication with Friends and Family:     Frequency of Social Gatherings with Friends and Family:     Attends Yazdanism Services:     Active Member of Clubs or Organizations:     Attends Club or Organization Meetings:     Marital Status:    Intimate Partner Violence:     Fear of Current or Ex-Partner:     Emotionally Abused:     Physically Abused:     Sexually Abused: ALLERGIES: Bactrim [sulfamethoprim ds], Codeine, Morphine, Phenergan fortis, Sulfa (sulfonamide antibiotics), and Ultram [tramadol]    Review of Systems   Constitutional: Negative for chills and fever. HENT: Negative for drooling and nosebleeds. Eyes: Negative for pain and itching. Respiratory: Negative for choking and stridor. Cardiovascular: Negative for leg swelling. Gastrointestinal: Negative for abdominal distention and rectal pain. Endocrine: Negative for heat intolerance and polyphagia. Genitourinary: Negative for enuresis and genital sores. Musculoskeletal: Negative for arthralgias and joint swelling. Skin: Negative for color change. Allergic/Immunologic: Negative for immunocompromised state. Neurological: Positive for headaches. Negative for tremors and speech difficulty. Hematological: Negative for adenopathy. Psychiatric/Behavioral: Negative for dysphoric mood and sleep disturbance. Vitals:    09/20/21 1423   BP: (!) 144/86   Pulse: 97   Resp: 16   Temp: 97.6 °F (36.4 °C)   SpO2: 97%            Physical Exam  Vitals and nursing note reviewed. Constitutional:       General: She is not in acute distress. Appearance: She is well-developed. She is not ill-appearing, toxic-appearing or diaphoretic. HENT:      Head: Normocephalic and atraumatic. Nose: Nose normal.   Eyes:      Conjunctiva/sclera: Conjunctivae normal.   Cardiovascular:      Rate and Rhythm: Normal rate and regular rhythm.       Heart sounds: Normal heart sounds. Pulmonary:      Effort: Pulmonary effort is normal. No respiratory distress. Breath sounds: Normal breath sounds. Abdominal:      General: There is no distension. Palpations: Abdomen is soft. Musculoskeletal:         General: No deformity. Normal range of motion. Cervical back: Normal range of motion and neck supple. Skin:     General: Skin is warm and dry. Neurological:      Mental Status: She is alert and oriented to person, place, and time. Cranial Nerves: No cranial nerve deficit. Sensory: No sensory deficit. Motor: No weakness. Coordination: Coordination normal.      Gait: Gait normal.   Psychiatric:         Behavior: Behavior normal.          MDM  Number of Diagnoses or Management Options  Other headache syndrome  Diagnosis management comments: CT scan performed prior to my evaluation. Pt does not want any further testing at this time. Would like referral to neurology. Procedures    Patient's results have been reviewed with them. Patient and/or family have verbally conveyed their understanding and agreement of the patient's signs, symptoms, diagnosis, treatment and prognosis and additionally agree to follow up as recommended or return to the Emergency Room should their condition change prior to follow-up. Discharge instructions have also been provided to the patient with some educational information regarding their diagnosis as well a list of reasons why they would want to return to the ER prior to their follow-up appointment should their condition change.

## 2021-10-18 ENCOUNTER — OFFICE VISIT (OUTPATIENT)
Dept: CARDIOLOGY CLINIC | Age: 66
End: 2021-10-18
Payer: MEDICARE

## 2021-10-18 VITALS
RESPIRATION RATE: 16 BRPM | SYSTOLIC BLOOD PRESSURE: 110 MMHG | HEIGHT: 67 IN | BODY MASS INDEX: 27 KG/M2 | WEIGHT: 172 LBS | DIASTOLIC BLOOD PRESSURE: 72 MMHG | HEART RATE: 94 BPM | OXYGEN SATURATION: 98 %

## 2021-10-18 DIAGNOSIS — R00.2 HEART PALPITATIONS: Primary | ICD-10-CM

## 2021-10-18 DIAGNOSIS — Z82.49 FAMILY HISTORY OF EARLY CAD: ICD-10-CM

## 2021-10-18 PROCEDURE — G8399 PT W/DXA RESULTS DOCUMENT: HCPCS | Performed by: INTERNAL MEDICINE

## 2021-10-18 PROCEDURE — 93000 ELECTROCARDIOGRAM COMPLETE: CPT | Performed by: INTERNAL MEDICINE

## 2021-10-18 PROCEDURE — G8427 DOCREV CUR MEDS BY ELIG CLIN: HCPCS | Performed by: INTERNAL MEDICINE

## 2021-10-18 PROCEDURE — 99213 OFFICE O/P EST LOW 20 MIN: CPT | Performed by: INTERNAL MEDICINE

## 2021-10-18 PROCEDURE — G8419 CALC BMI OUT NRM PARAM NOF/U: HCPCS | Performed by: INTERNAL MEDICINE

## 2021-10-18 PROCEDURE — G8536 NO DOC ELDER MAL SCRN: HCPCS | Performed by: INTERNAL MEDICINE

## 2021-10-18 PROCEDURE — G8510 SCR DEP NEG, NO PLAN REQD: HCPCS | Performed by: INTERNAL MEDICINE

## 2021-10-18 PROCEDURE — 1090F PRES/ABSN URINE INCON ASSESS: CPT | Performed by: INTERNAL MEDICINE

## 2021-10-18 PROCEDURE — 3017F COLORECTAL CA SCREEN DOC REV: CPT | Performed by: INTERNAL MEDICINE

## 2021-10-18 PROCEDURE — 1101F PT FALLS ASSESS-DOCD LE1/YR: CPT | Performed by: INTERNAL MEDICINE

## 2021-10-18 NOTE — LETTER
Patient:  Kendra Murrieta   YOB: 1955  Date of Visit: 10/18/2021      Dear Shell Arteaga DO  [address]  [address] [id number]  Via Fax: [phone]:      Ms. Daly Pablo is a 75 yo F with h/o hypothyroid, h/o shoulder surgery on 7/2011, question h/o MV prolapse. 2014 stress test and an echo was normal.  Holter in 2012 that noted benign PVCs. 2/2020 stress echo was normal.    On her last visit due to chest pain, had her obtain a stress test that was normal.  Overall, she has not been having any exertional chest pains. Her breathing has been stable. She does have occasional palpitations and approximately three weeks ago had spells that were lasting throughout the night. For the most part, no associated lightheadedness or dizziness, though on occasion she will get a sensation of \"catching her breath\". She is compensated on exam with clear lungs and no lower extremity edema. She is exercising regularly. Fam Hx. Father 47 yo. Brother carotid dx age 72. Soc Hx. Works . No tobacco  Assessment and Plan:   1. Palpitations. Holter in the past noted benign PVCs. Overall has just occasional flare. We did talk about a trial of low dose beta blocker if needed in the future, though not indicated at this time. Will have her follow back in one year. 2. Family history of early coronary artery disease. We did talk in the past about considering coronary calcium score, but she does not want to take preventative therapies in terms of medications. She is holistic, therefore, I am not sure that there is any benefit to obtaining a coronary scan. Her stress test last year was normal.      If you have questions, please do not hesitate to call me.     Sincerely,      Daron Wade MD

## 2021-10-18 NOTE — PROGRESS NOTES
[facility] [place]: Jany Corporal  [phone]    HPI:  Ms. Margurite Hodgkins is a 75 yo F with h/o hypothyroid, h/o shoulder surgery on 7/2011, question h/o MV prolapse. 2014 stress test and an echo was normal.  Holter in 2012 that noted benign PVCs. 2/2020 stress echo was normal.    On her last visit due to chest pain, had her obtain a stress test that was normal.  Overall, she has not been having any exertional chest pains. Her breathing has been stable. She does have occasional palpitations and approximately three weeks ago had spells that were lasting throughout the night. For the most part, no associated lightheadedness or dizziness, though on occasion she will get a sensation of \"catching her breath\". She is compensated on exam with clear lungs and no lower extremity edema. She is exercising regularly. Fam Hx. Father 49 yo. Brother carotid dx age 72. Soc Hx. Works . No tobacco  Assessment and Plan:   1. Palpitations. Holter in the past noted benign PVCs. Overall has just occasional flare. We did talk about a trial of low dose beta blocker if needed in the future, though not indicated at this time. Will have her follow back in one year. 2. Family history of early coronary artery disease. We did talk in the past about considering coronary calcium score, but she does not want to take preventative therapies in terms of medications. She is holistic, therefore, I am not sure that there is any benefit to obtaining a coronary scan. Her stress test last year was normal.            She  has a past medical history of Arthritis, Cervical ca ([address] Hyperlipidemia, Hypothyroid, Leg swelling, Ocular migraine, Psychiatric disorder, Skipped beats, and Snoring. She also has no past medical history of Diabetes ([address] or Essential hypertension. All other systems negative except as above.      PE  Vitals:    10/18/21 1456   BP: 110/72   Pulse: 94   Resp: 16   SpO2: 98%   Weight: 172 lb (78 kg) Height: 5' 7\" (1.702 m)    Body mass index is 26.94 kg/m². General appearance - alert, well appearing, and in no distress  Mental status - affect appropriate to mood  Neck - supple, no JVD  Chest - clear to auscultation, no wheezes, rales or rhonchi  Heart - normal rate, regular rhythm, normal S1, S2, no murmurs, rubs, clicks or gallops  Abdomen - soft, nontender, nondistended  Extremities - peripheral pulses normal, no pedal edema    Recent Labs:  Lab Results   Component Value Date/Time    Cholesterol, total 167 07/06/2009 03:35 AM    HDL Cholesterol 55 07/06/2009 03:35 AM    LDL, calculated 100.8 (H) 07/06/2009 03:35 AM    Triglyceride 56 07/06/2009 03:35 AM    CHOL/HDL Ratio 3.0 07/06/2009 03:35 AM     Lab Results   Component Value Date/Time    Creatinine 0.66 03/30/2021 03:39 PM     Lab Results   Component Value Date/Time    BUN 18 03/30/2021 03:39 PM     Lab Results   Component Value Date/Time    Potassium 3.9 03/30/2021 03:39 PM     No results found for: HBA1C, [id number]  Lab Results   Component Value Date/Time    HGB 13.2 03/30/2021 03:39 PM     Lab Results   Component Value Date/Time    PLATELET 081 54/99/3556 03:39 PM       Reviewed:  Past Medical History:   Diagnosis Date    Arthritis     [address]ical ca [address] [address]     Hyperlipidemia     Hypothyroid     Leg swelling     Ocular migraine     Psychiatric disorder     depression    Skipped beats     Snoring      Social History     Tobacco Use   Smoking Status Never Smoker   Smokeless Tobacco Never Used     Social History     Substance and Sexual Activity   Alcohol Use Not Currently    Comment: once a month glass of wine     Allergies   Allergen Reactions    Bactrim [Sulfamethoprim Ds] Unknown (comments)    Codeine Nausea Only    Morphine Itching    Phenergan Fortis Other (comments)     Parkinson like symptoms    Sulfa (Sulfonamide Antibiotics) Unknown (comments)    Ultram [Tramadol] Nausea Only       No current outpatient medications on file.      No current facility-administered medications for this visit.        Wayne Salcido MD  [employer] [facility]  [address], [address] [facility] [address]  [address]

## 2021-11-19 ENCOUNTER — OFFICE VISIT (OUTPATIENT)
Dept: NEUROLOGY | Age: 66
End: 2021-11-19
Payer: MEDICARE

## 2021-11-19 VITALS
RESPIRATION RATE: 16 BRPM | BODY MASS INDEX: 25.76 KG/M2 | OXYGEN SATURATION: 97 % | DIASTOLIC BLOOD PRESSURE: 70 MMHG | HEIGHT: 68 IN | WEIGHT: 170 LBS | SYSTOLIC BLOOD PRESSURE: 120 MMHG | HEART RATE: 72 BPM

## 2021-11-19 DIAGNOSIS — G43.101 MIGRAINE WITH AURA AND WITH STATUS MIGRAINOSUS, NOT INTRACTABLE: Primary | ICD-10-CM

## 2021-11-19 DIAGNOSIS — G43.809 MIGRAINE VARIANT WITH HEADACHE: ICD-10-CM

## 2021-11-19 DIAGNOSIS — I67.1 ANEURYSM OF INTRACRANIAL PORTION OF RIGHT INTERNAL CAROTID ARTERY: ICD-10-CM

## 2021-11-19 PROCEDURE — 99204 OFFICE O/P NEW MOD 45 MIN: CPT | Performed by: PSYCHIATRY & NEUROLOGY

## 2021-11-19 PROCEDURE — 1090F PRES/ABSN URINE INCON ASSESS: CPT | Performed by: PSYCHIATRY & NEUROLOGY

## 2021-11-19 PROCEDURE — 3017F COLORECTAL CA SCREEN DOC REV: CPT | Performed by: PSYCHIATRY & NEUROLOGY

## 2021-11-19 PROCEDURE — G8399 PT W/DXA RESULTS DOCUMENT: HCPCS | Performed by: PSYCHIATRY & NEUROLOGY

## 2021-11-19 PROCEDURE — G8510 SCR DEP NEG, NO PLAN REQD: HCPCS | Performed by: PSYCHIATRY & NEUROLOGY

## 2021-11-19 PROCEDURE — G8419 CALC BMI OUT NRM PARAM NOF/U: HCPCS | Performed by: PSYCHIATRY & NEUROLOGY

## 2021-11-19 PROCEDURE — 1101F PT FALLS ASSESS-DOCD LE1/YR: CPT | Performed by: PSYCHIATRY & NEUROLOGY

## 2021-11-19 PROCEDURE — G8536 NO DOC ELDER MAL SCRN: HCPCS | Performed by: PSYCHIATRY & NEUROLOGY

## 2021-11-19 PROCEDURE — G8427 DOCREV CUR MEDS BY ELIG CLIN: HCPCS | Performed by: PSYCHIATRY & NEUROLOGY

## 2021-11-19 NOTE — PROGRESS NOTES
Chief Complaint   Patient presents with    New Patient     migraines, referred by [facility] ER, 9/2021, she states she has had one other headache since then     Visit Vitals  /70 (BP 1 Location: Left upper arm, BP Patient Position: Sitting)   Pulse 72   Resp 16   Ht 5' 8\" (1.727 m)   Wt 77.1 kg (170 lb)   SpO2 97%   BMI 25.85 kg/m²

## 2021-11-19 NOTE — PROGRESS NOTES
[facility] and [address] at [facility] at [address] [facility], [address] [facility] [address] [facility], [address] [place] [address]  [phone] Office  [phone]           Referring: Aracelis Jett MD  [facility],  [address]    Chief Complaint   Patient presents with    New Patient     migraines, referred by [facility] ER, 9/2021, she states she has had one other headache since then   47-year-old lady presents today for evaluation of headaches. She notes that she went to the emergency room in September of this year with a right orbital headache. She called Dr. Neena Savage office as she was having ongoing headache for about 2 weeks unrelieved by over-the-counter medicine and she was referred to the emergency room. Everything was normal in the emergency room. She was offered a migraine cocktail but she deferred as she does not like a lot of medicine. She is coming for follow-up. Since then she has had some mild headache may be one every 2 to 3 weeks lasting a couple of hours. She may take some Advil. Nothing major. No associated symptoms. No focal deficits. Concerned about her right ICA aneurysm. No loss or alteration in consciousness. Has had ocular migraines in the past.    Record review finds patient had an emergency department visit September 20 where she came in with right-sided headache. Apparently she was noted to have been seen previously by neurology but could not get in until January 2022 so came to the emergency department. Unremarkable examination. Imaging with a head CT that is personally reviewed unremarkable. She also had an MRI of the brain done March 2021 and I personally reviewed that scan unremarkable. CTA of the head neck from March 2021 unremarkable.     Patient was seen by my partner, Dr. Geo Nunez September 25, 2018 for headache in the occipital region pulsating lasting a few minutes then subsiding. No changes in vision speech or any focal motor or sensory deficits. She would go to the emergency department. Her examination was unremarkable. His impression was that of new daily persistent dull headache in the occipital region but she was also having tinnitus of a pulsatile nature so sent her for an MRI and an MRA. Note with Dr. Cathleen Martinez June 2019 noting several small right ICA aneurysm with a less than 1% annual cumulative risk of rupture with no treatment recommended. Annual MRI surveillance. Past Medical History:   Diagnosis Date    Arthritis     Cervical ca ([address]     Hyperlipidemia     Hypothyroid     Leg swelling     Ocular migraine     Psychiatric disorder     depression    Skipped beats     Snoring        Past Surgical History:   Procedure Laterality Date    HX HYSTERECTOMY      HX ORTHOPAEDIC      R thumb joint replacement    HX ORTHOPAEDIC      L thumb            Allergies   Allergen Reactions    Bactrim [Sulfamethoprim Ds] Unknown (comments)    Codeine Nausea Only    Morphine Itching    Phenergan Fortis Other (comments)     Parkinson like symptoms    Sulfa (Sulfonamide Antibiotics) Unknown (comments)    Ultram [Tramadol] Nausea Only       Social History     Tobacco Use    Smoking status: Never Smoker    Smokeless tobacco: Never Used   Substance Use Topics    Alcohol use: Not Currently     Comment: once a month glass of wine    Drug use: No       Family History   Problem Relation Age of Onset    Heart Disease Mother     Pulmonary Fibrosis Mother     Heart Disease Father     Stroke Brother        Review of Systems  Pertinent positives and negatives as noted.       Examination  Visit Vitals  /70 (BP 1 Location: Left upper arm, BP Patient Position: Sitting)   Pulse 72   Resp 16   Ht 5' 8\" (1.727 m)   Wt 77.1 kg (170 lb)   SpO2 97%   BMI 25.85 kg/m²     Neurologically, she is awake, alert, and oriented with normal speech and language. Her cognition is normal.    Intact cranial nerves 2-12. No nystagmus. Disk margins are flat bilaterally. She has normal bulk and tone. She has no abnormal movement. She has no pronation or drift. She generates full strength in the upper and lower extremities to direct confrontational testing. Reflexes are symmetrical in the upper and lower extremities bilaterally. No pathologic reflexes are elicited. Finger nose finger and rapid alternating movements are normal.  Steady gait. Impression/Plan  Nice 58-year-old lady with migraine with an episode of transformed migraine/status migrainosus which prompted ED visit  Given infrequent migraines at present I gave her some samples of Nurtec and she can have that on hand to use if she gets 1 for now  If this is effective for her she will let me know and we will send a prescription    Small right ICA aneurysm  She had a CTA in March of this year so we will defer any further imaging  Continue to follow with Dr. Kathryn Pappas in that regard    We will follow up with me in 6 months, sooner as needed    Kelli Pollard MD          This note was created using voice recognition software. Despite editing, there may be syntax errors.

## 2022-03-19 PROBLEM — I67.1 ANEURYSM OF INTRACRANIAL PORTION OF RIGHT INTERNAL CAROTID ARTERY: Status: ACTIVE | Noted: 2018-11-05

## 2022-05-21 ENCOUNTER — HOSPITAL ENCOUNTER (EMERGENCY)
Age: 67
Discharge: HOME | End: 2022-05-21
Attending: EMERGENCY MEDICINE | Admitting: EMERGENCY MEDICINE
Payer: MEDICARE

## 2022-05-21 ENCOUNTER — APPOINTMENT (OUTPATIENT)
Dept: GENERAL RADIOLOGY | Age: 67
End: 2022-05-21
Attending: EMERGENCY MEDICINE
Payer: MEDICARE

## 2022-05-21 VITALS
TEMPERATURE: 97.8 F | OXYGEN SATURATION: 98 % | BODY MASS INDEX: 25.61 KG/M2 | WEIGHT: 169 LBS | RESPIRATION RATE: 15 BRPM | HEIGHT: 68 IN | SYSTOLIC BLOOD PRESSURE: 113 MMHG | HEART RATE: 100 BPM | DIASTOLIC BLOOD PRESSURE: 73 MMHG

## 2022-05-21 DIAGNOSIS — M54.41 ACUTE RIGHT-SIDED LOW BACK PAIN WITH RIGHT-SIDED SCIATICA: Primary | ICD-10-CM

## 2022-05-21 LAB
APPEARANCE UR: CLEAR
BACTERIA URNS QL MICRO: NEGATIVE /HPF
BILIRUB UR QL: NEGATIVE
COLOR UR: NORMAL
EPITH CASTS URNS QL MICRO: NORMAL /LPF
GLUCOSE UR STRIP.AUTO-MCNC: NEGATIVE MG/DL
HGB UR QL STRIP: NEGATIVE
HYALINE CASTS URNS QL MICRO: NORMAL /LPF (ref 0–5)
KETONES UR QL STRIP.AUTO: NEGATIVE MG/DL
LEUKOCYTE ESTERASE UR QL STRIP.AUTO: NEGATIVE
NITRITE UR QL STRIP.AUTO: NEGATIVE
PH UR STRIP: 5.5 [PH] (ref 5–8)
PROT UR STRIP-MCNC: NEGATIVE MG/DL
RBC #/AREA URNS HPF: NORMAL /HPF (ref 0–5)
SP GR UR REFRACTOMETRY: 1.02 (ref 1–1.03)
UR CULT HOLD, URHOLD: NORMAL
UROBILINOGEN UR QL STRIP.AUTO: 0.2 EU/DL (ref 0.2–1)
WBC URNS QL MICRO: NORMAL /HPF (ref 0–4)

## 2022-05-21 PROCEDURE — 81001 URINALYSIS AUTO W/SCOPE: CPT

## 2022-05-21 PROCEDURE — 72100 X-RAY EXAM L-S SPINE 2/3 VWS: CPT

## 2022-05-21 PROCEDURE — 74011250636 HC RX REV CODE- 250/636: Performed by: EMERGENCY MEDICINE

## 2022-05-21 PROCEDURE — 99284 EMERGENCY DEPT VISIT MOD MDM: CPT

## 2022-05-21 PROCEDURE — 74011000250 HC RX REV CODE- 250: Performed by: EMERGENCY MEDICINE

## 2022-05-21 PROCEDURE — 36416 COLLJ CAPILLARY BLOOD SPEC: CPT

## 2022-05-21 PROCEDURE — 96372 THER/PROPH/DIAG INJ SC/IM: CPT

## 2022-05-21 RX ORDER — METHOCARBAMOL 750 MG/1
750 TABLET, FILM COATED ORAL 4 TIMES DAILY
Qty: 20 TABLET | Refills: 0 | Status: SHIPPED | OUTPATIENT
Start: 2022-05-21 | End: 2022-05-26

## 2022-05-21 RX ORDER — METHYLPREDNISOLONE 4 MG/1
TABLET ORAL
Qty: 1 DOSE PACK | Refills: 0 | Status: SHIPPED | OUTPATIENT
Start: 2022-05-21

## 2022-05-21 RX ORDER — LIDOCAINE 50 MG/G
PATCH TOPICAL
Qty: 5 EACH | Refills: 0 | Status: SHIPPED | OUTPATIENT
Start: 2022-05-21

## 2022-05-21 RX ORDER — METHOCARBAMOL 750 MG/1
750 TABLET, FILM COATED ORAL
Status: DISCONTINUED | OUTPATIENT
Start: 2022-05-21 | End: 2022-05-21 | Stop reason: HOSPADM

## 2022-05-21 RX ORDER — LIDOCAINE 4 G/100G
1 PATCH TOPICAL
Status: DISCONTINUED | OUTPATIENT
Start: 2022-05-21 | End: 2022-05-21 | Stop reason: HOSPADM

## 2022-05-21 RX ORDER — KETOROLAC TROMETHAMINE 30 MG/ML
30 INJECTION, SOLUTION INTRAMUSCULAR; INTRAVENOUS
Status: COMPLETED | OUTPATIENT
Start: 2022-05-21 | End: 2022-05-21

## 2022-05-21 RX ORDER — NAPROXEN 500 MG/1
500 TABLET ORAL 2 TIMES DAILY WITH MEALS
Qty: 14 TABLET | Refills: 0 | Status: SHIPPED | OUTPATIENT
Start: 2022-05-21 | End: 2022-05-28

## 2022-05-21 RX ADMIN — KETOROLAC TROMETHAMINE 30 MG: 30 INJECTION, SOLUTION INTRAMUSCULAR; INTRAVENOUS at 11:41

## 2022-05-21 NOTE — ED PROVIDER NOTES
Please note that this dictation was completed with e[employer], the computer voice recognition software.  Quite often unanticipated grammatical, syntax, homophones, and other interpretive errors are inadvertently transcribed by the computer software.  Please disregard these errors.  Please excuse any errors that have escaped final proofreading. Patient is a 75-year-old female history of arthritis, hypothyroidism, migraines, hyperlipidemia, depression, presenting ED for evaluation of lower back pain with onset 3 days ago. She states it is located in the right lower back and radiates to her right hip and right leg. Pain is consistent. Admits that she has been doing yard work recently but denies any injury or significant heavy lifting. She does have a history of lower back pain but states that this is more extreme than usual.  Has been taking Advil with some relief. She denies fever, abdominal pain, nausea, vomiting, saddle paresthesia, weakness, bladder or bowel dysfunction, or any additional medical complaints at this time.            Past Medical History:   Diagnosis Date    Arthritis     Cervical ca ([address]     Hyperlipidemia     Hypothyroid     Leg swelling     Ocular migraine     Psychiatric disorder     depression    Skipped beats     Snoring        Past Surgical History:   Procedure Laterality Date    HX HYSTERECTOMY      HX ORTHOPAEDIC      R thumb joint replacement    HX ORTHOPAEDIC      L thumb         Family History:   Problem Relation Age of Onset    Heart Disease Mother     Pulmonary Fibrosis Mother     Heart Disease Father     Stroke Brother        Social History     Socioeconomic History    Marital status: SINGLE     Spouse name: Not on file    Number of children: Not on file    Years of education: Not on file    Highest education level: Not on file   Occupational History    Not on file   Tobacco Use    Smoking status: Never Smoker    Smokeless tobacco: Never Used   Substance and Sexual Activity    Alcohol use: Not Currently     Comment: once a month glass of wine    Drug use: No    Sexual activity: Not on file   Other Topics Concern    Not on file   Social History Narrative    Not on file     Social Determinants of Health     Financial Resource Strain:     Difficulty of Paying Living Expenses: Not on file   Food Insecurity:     Worried About Running Out of Food in the Last Year: Not on file    Debbi of Food in the Last Year: Not on file   Transportation Needs:     Lack of Transportation (Medical): Not on file    Lack of Transportation (Non-Medical): Not on file   Physical Activity:     Days of Exercise per Week: Not on file    Minutes of Exercise per Session: Not on file   Stress:     Feeling of Stress : Not on file   Social Connections:     Frequency of Communication with Friends and Family: Not on file    Frequency of Social Gatherings with Friends and Family: Not on file    Attends Caodaism Services: Not on file    Active Member of [address] [place] [address] or Organizations: Not on file    Attends Club or Organization Meetings: Not on file    Marital Status: Not on file   Intimate Partner Violence:     Fear of Current or Ex-Partner: Not on file    Emotionally Abused: Not on file    Physically Abused: Not on file    Sexually Abused: Not on file   Housing Stability:     Unable to Pay for Housing in the Last Year: Not on file    Number of [place] in the Last Year: Not on file    Unstable Housing in the Last Year: Not on file         ALLERGIES: Bactrim [sulfamethoprim ds], Codeine, Morphine, Phenergan fortis, Sulfa (sulfonamide antibiotics), and Ultram [tramadol]    Review of Systems   Constitutional: Negative for chills and fever. HENT: Negative for congestion, ear pain and sore throat. Eyes: Negative for visual disturbance. Respiratory: Negative for cough and shortness of breath. Cardiovascular: Negative for chest pain.    Gastrointestinal: Negative for abdominal pain, diarrhea, nausea and vomiting. Genitourinary: Negative for dysuria and flank pain. Musculoskeletal: Positive for back pain. Skin: Negative for color change. Neurological: Negative for dizziness and headaches. Psychiatric/Behavioral: Negative for confusion. Vitals:    05/21/22 1005   BP: 113/73   Pulse: 100   Resp: 15   Temp: 97.8 °F (36.6 °C)   SpO2: 98%   Weight: 76.7 kg (169 lb)   Height: 5' 8\" (1.727 m)            Physical Exam  Vitals and nursing note reviewed. Constitutional:       General: She is not in acute distress. Appearance: Normal appearance. She is not ill-appearing. HENT:      Head: Normocephalic and atraumatic. Eyes:      General: Vision grossly intact. Extraocular Movements: Extraocular movements intact. Conjunctiva/sclera: Conjunctivae normal.   Neck:      Trachea: Phonation normal.   Cardiovascular:      Rate and Rhythm: Normal rate and regular rhythm. Heart sounds: Normal heart sounds. Pulmonary:      Effort: Pulmonary effort is normal.      Breath sounds: Normal breath sounds and air entry. Abdominal:      Palpations: Abdomen is soft. Tenderness: There is no abdominal tenderness. There is no right CVA tenderness or left CVA tenderness. Musculoskeletal:         General: Normal range of motion. Cervical back: Normal.      Thoracic back: Normal.      Lumbar back: Tenderness present. No bony tenderness. Normal range of motion. Negative right straight leg raise test and negative left straight leg raise test.      Comments: Tenderness to palpation in the right lumbar muscular region. Full range of movement. No midline tenderness, crepitus, step-offs, or deformities. No overlying skin changes. Lower extremity strength 5/5 bilaterally. BLE distal pulses and gross sensation intact and equal.     Skin:     General: Skin is warm and dry. Neurological:      General: No focal deficit present.       Mental Status: She is alert and oriented to person, place, and time. Psychiatric:         Behavior: Behavior normal.          MDM  Number of Diagnoses or Management Options  Acute right-sided low back pain with right-sided sciatica  Diagnosis management comments: Patient is alert, afebrile, vital stable. Presents ambulatory for 3 days of atraumatic lower back pain with radiation ton her right lower extremity. Lumbar tenderness on exam, no CVA or flank pain. No urinary symptoms. No red flag symptoms of fever, hx of IVD use, weight loss, midline tenderness, saddle anesthesia, weakness, fecal/urinary incontinence or urinary retention. Urinalysis clear. X-ray showing degenerative changes without acute pathology. Will trial nsaids/muscle relaxers, steroid taper/lidocaine patches, gentle stretching, and refer to orthospine. Return precautions outlined. All questions answered at this time. ED Course as of 05/21/22 1112   Sat May 21, 2022   1110 URINALYSIS W/MICROSCOPIC:    Color YELLOW/STRAW   Appearance CLEAR   Specific gravity 1.020   pH (UA) 5.5   Protein Negative   Glucose Negative   Ketone Negative   Bilirubin Negative   Blood Negative   Urobilinogen 0.2   Nitrites Negative   Leukocyte Esterase Negative   WBC 0-4   RBC 0-5   Epithelial cells FEW   Bacteria Negative   Hyaline cast 0-2 [EP]   1112 XR SPINE LUMB 2 OR 3 V  FINDINGS:  Mild levoscoliosis. Alignment is anatomic. Vertebral body heights are  maintained. Loss of disc space height at L4-L5. Multilevel degenerative endplate  osteophytes and facet arthritis.     IMPRESSION     No acute compression fracture nor subluxation. Degenerative changes. [EP]      ED Course User Index  [EP] ORALIA Blackwood       Pt has been reevaluated. There are no new complaints, changes, or physical findings at this time. All results have been reviewed with patient and/or family. Medications have been reviewed w/ pt and/or family. Pt and/or family's questions have been answered.  Pt and/or family expressed good understanding of the dx/tx/rx and is in agreement with plan of care. Pt instructed and agreed to f/u w/ ortho and to return to ED upon further deterioration. Pt is ready for discharge. IMPRESSION:  1. Acute right-sided low back pain with right-sided sciatica        PLAN:  1. Discharge Medication List as of 5/21/2022 11:31 AM      START taking these medications    Details   naproxen (NAPROSYN) 500 mg tablet Take 1 Tablet by mouth two (2) times daily (with meals) for 7 days. Indications: pain, Normal, Disp-14 Tablet, R-0      methocarbamoL (ROBAXIN) 750 mg tablet Take 1 Tablet by mouth four (4) times daily for 5 days. Indications: muscle spasm, Normal, Disp-20 Tablet, R-0      lidocaine (Lidoderm) 5 % Apply patch to the affected area for 12 hours a day and remove for 12 hours a day., Normal, Disp-5 Each, R-0      methylPREDNISolone (Medrol, Braulio,) 4 mg tablet Follow instructions on packaging, Normal, Disp-1 Dose Pack, R-0           2.    Follow-up Information     Follow up With Specialties Details Why Contact Info    Jose Allan MD Family Medicine Schedule an appointment as soon as possible for a visit   [address] [place] [address],[address]      Corine Esposito MD Orthopedic Surgery Schedule an appointment as soon as possible for a visit   [address]  [phone]              Return to ED if worse     Procedures

## 2022-05-21 NOTE — DISCHARGE INSTRUCTIONS
Robaxin up to 4 times daily as needed for muscle spasm. No driving while on this medication, it can make you drowsy. Steroid tablets, take with breakfast each morning for the next 6 days. Follow instructions on packaging. Naproxen every 12 hours for 7-10 days. Use on a schedule for the  next 3 days at a minimum, even if not having pain. Naproxen is an anti-inflammatory medication called an NSAID. Do not take ibuprofen/motrin while on this medication, it is okay to take Tylenol. It will help to treat the source of your pain. It is best to use this medication on a schedule. You need to keep the blood levels of the medication up for effective treatment. Narcotic pain medications only mask the pain and do not treat the source of your pain. Gentle stretching of the lower back    You should use ice or heat for your injury and pain. You may use one or the other or alternate between the two. ICE ONLY FOR FIRST 50 HOURS after your injury! If it has been longer than 48 hours you may start with either. If you use ice: apply the ice pack in 20 minute intervals. 20 minutes on then 20 minutes off. Make sure to protect the skin to prevent frost bite. Never apply ice or a plastic bag with ice directly to the skin. If you use heat: Do NOT lay or sleep on a heating pad. You will burn your skin. Instead, use microwave style heat packs or Thermacare packs. These are safer than traditional heating pads. Monitor your skin to prevent burns. Should you develop inability to stand, controlling your bowel or bladder, numbness in the groin were your wipe yourself after using the bathroom, develop fevers or chills you should return to the emergency department immediately.

## 2022-05-21 NOTE — ED TRIAGE NOTES
Pt ambulatory to ED accompanied by  with c/o right lower back pain radiating into buttocks onset 3 days ago. Denies injury, dysuria or fever.

## 2022-06-01 ENCOUNTER — TRANSCRIBE ORDER (OUTPATIENT)
Dept: SCHEDULING | Age: 67
End: 2022-06-01

## 2022-06-01 DIAGNOSIS — M54.50 LUMBAR PAIN: Primary | ICD-10-CM

## 2022-06-01 DIAGNOSIS — M41.50 DEGENERATIVE SCOLIOSIS: ICD-10-CM

## 2022-06-01 DIAGNOSIS — M54.16 LUMBAR RADICULOPATHY: ICD-10-CM

## 2022-06-01 DIAGNOSIS — M51.36 DDD (DEGENERATIVE DISC DISEASE), LUMBAR: ICD-10-CM

## 2022-06-01 DIAGNOSIS — M47.816 LUMBAR FACET ARTHROPATHY: ICD-10-CM

## 2022-06-14 ENCOUNTER — APPOINTMENT (OUTPATIENT)
Dept: GENERAL RADIOLOGY | Age: 67
End: 2022-06-14
Attending: EMERGENCY MEDICINE
Payer: MEDICARE

## 2022-06-14 ENCOUNTER — HOSPITAL ENCOUNTER (EMERGENCY)
Age: 67
Discharge: HOME | End: 2022-06-14
Attending: EMERGENCY MEDICINE | Admitting: EMERGENCY MEDICINE
Payer: MEDICARE

## 2022-06-14 VITALS
SYSTOLIC BLOOD PRESSURE: 135 MMHG | OXYGEN SATURATION: 97 % | HEART RATE: 93 BPM | TEMPERATURE: 97.2 F | RESPIRATION RATE: 16 BRPM | DIASTOLIC BLOOD PRESSURE: 77 MMHG

## 2022-06-14 DIAGNOSIS — R07.9 CHEST PAIN, UNSPECIFIED TYPE: Primary | ICD-10-CM

## 2022-06-14 LAB
ALBUMIN SERPL-MCNC: 3.8 G/DL (ref 3.5–5)
ALBUMIN/GLOB SERPL: 1.2 {RATIO} (ref 1.1–2.2)
ALP SERPL-CCNC: 71 U/L (ref 45–117)
ALT SERPL-CCNC: 19 U/L (ref 12–78)
ANION GAP SERPL CALC-SCNC: 5 MMOL/L (ref 5–15)
AST SERPL-CCNC: 18 U/L (ref 15–37)
ATRIAL RATE: 99 BPM
BASOPHILS # BLD: 0 K/UL (ref 0–0.1)
BASOPHILS NFR BLD: 0 % (ref 0–1)
BILIRUB SERPL-MCNC: 0.4 MG/DL (ref 0.2–1)
BNP SERPL-MCNC: 139 PG/ML
BUN SERPL-MCNC: 17 MG/DL (ref 6–20)
BUN/CREAT SERPL: 25 (ref 12–20)
CALCIUM SERPL-MCNC: 9.7 MG/DL (ref 8.5–10.1)
CALCULATED P AXIS, ECG09: 74 DEGREES
CALCULATED R AXIS, ECG10: 64 DEGREES
CALCULATED T AXIS, ECG11: 27 DEGREES
CHLORIDE SERPL-SCNC: 108 MMOL/L (ref 97–108)
CO2 SERPL-SCNC: 28 MMOL/L (ref 21–32)
CREAT SERPL-MCNC: 0.67 MG/DL (ref 0.55–1.02)
D DIMER PPP FEU-MCNC: 0.41 MG/L FEU (ref 0–0.65)
DIAGNOSIS, 93000: NORMAL
DIFFERENTIAL METHOD BLD: ABNORMAL
EOSINOPHIL # BLD: 0.1 K/UL (ref 0–0.4)
EOSINOPHIL NFR BLD: 1 % (ref 0–7)
ERYTHROCYTE [DISTWIDTH] IN BLOOD BY AUTOMATED COUNT: 12.8 % (ref 11.5–14.5)
GLOBULIN SER CALC-MCNC: 3.3 G/DL (ref 2–4)
GLUCOSE SERPL-MCNC: 107 MG/DL (ref 65–100)
HCT VFR BLD AUTO: 40.3 % (ref 35–47)
HGB BLD-MCNC: 13.1 G/DL (ref 11.5–16)
IMM GRANULOCYTES # BLD AUTO: 0 K/UL (ref 0–0.04)
IMM GRANULOCYTES NFR BLD AUTO: 0 % (ref 0–0.5)
LIPASE SERPL-CCNC: 166 U/L (ref 73–393)
LYMPHOCYTES # BLD: 0.9 K/UL (ref 0.8–3.5)
LYMPHOCYTES NFR BLD: 14 % (ref 12–49)
MCH RBC QN AUTO: 30.3 PG (ref 26–34)
MCHC RBC AUTO-ENTMCNC: 32.5 G/DL (ref 30–36.5)
MCV RBC AUTO: 93.1 FL (ref 80–99)
MONOCYTES # BLD: 0.4 K/UL (ref 0–1)
MONOCYTES NFR BLD: 6 % (ref 5–13)
NEUTS SEG # BLD: 5.3 K/UL (ref 1.8–8)
NEUTS SEG NFR BLD: 79 % (ref 32–75)
NRBC # BLD: 0 K/UL (ref 0–0.01)
NRBC BLD-RTO: 0 PER 100 WBC
P-R INTERVAL, ECG05: 150 MS
PLATELET # BLD AUTO: 235 K/UL (ref 150–400)
PMV BLD AUTO: 8.9 FL (ref 8.9–12.9)
POTASSIUM SERPL-SCNC: 3.9 MMOL/L (ref 3.5–5.1)
PROT SERPL-MCNC: 7.1 G/DL (ref 6.4–8.2)
Q-T INTERVAL, ECG07: 328 MS
QRS DURATION, ECG06: 74 MS
QTC CALCULATION (BEZET), ECG08: 420 MS
RBC # BLD AUTO: 4.33 M/UL (ref 3.8–5.2)
SODIUM SERPL-SCNC: 141 MMOL/L (ref 136–145)
TROPONIN-HIGH SENSITIVITY: <4 NG/L (ref 0–51)
VENTRICULAR RATE, ECG03: 99 BPM
WBC # BLD AUTO: 6.8 K/UL (ref 3.6–11)

## 2022-06-14 PROCEDURE — 85025 COMPLETE CBC W/AUTO DIFF WBC: CPT

## 2022-06-14 PROCEDURE — 83880 ASSAY OF NATRIURETIC PEPTIDE: CPT

## 2022-06-14 PROCEDURE — 80053 COMPREHEN METABOLIC PANEL: CPT

## 2022-06-14 PROCEDURE — 99285 EMERGENCY DEPT VISIT HI MDM: CPT

## 2022-06-14 PROCEDURE — 71046 X-RAY EXAM CHEST 2 VIEWS: CPT

## 2022-06-14 PROCEDURE — 36415 COLL VENOUS BLD VENIPUNCTURE: CPT

## 2022-06-14 PROCEDURE — 93005 ELECTROCARDIOGRAM TRACING: CPT

## 2022-06-14 PROCEDURE — 84484 ASSAY OF TROPONIN QUANT: CPT

## 2022-06-14 PROCEDURE — 85379 FIBRIN DEGRADATION QUANT: CPT

## 2022-06-14 PROCEDURE — 83690 ASSAY OF LIPASE: CPT

## 2022-06-14 NOTE — ED TRIAGE NOTES
Pt c/o midsternal chest tightness x 1 week. Mild sob. +nausea x weeks. Was treated for sinus infection 2 weeks ago.

## 2022-06-14 NOTE — ED PROVIDER NOTES
HPI patient is a 25-year-old female with past medical history significant for arthritis, cervical cancer, hyperlipidemia, hypothyroidism, leg swelling, migraines and sleep apnea who presents to the ED with intermittent chest pain for 2 weeks. She states that she just returned from a beach trip and had been unloading gear on Sunday. She denies any falls, direct injury or blunt trauma. Denies fever, cold symptoms, headache, neck pain, visual changes, focal weakness or rash. Denies any difficulty breathing, difficulty swallowing or abdominal pain. Reports episodic nausea but denies any vomiting or diarrhea. Pt. Reports that she has not any pain medications today prior to arrival.  She drove her self here. She reports approximately 2 weeks ago had a sinus infection which has resolved. She states she did use her albuterol metered-dose inhaler this morning with slight relief noted.         Past Medical History:   Diagnosis Date    Arthritis     Cervical ca ([address]     Hyperlipidemia     Hypothyroid     Leg swelling     Ocular migraine     Psychiatric disorder     depression    Skipped beats     Snoring        Past Surgical History:   Procedure Laterality Date    HX HYSTERECTOMY      HX ORTHOPAEDIC      R thumb joint replacement    HX ORTHOPAEDIC      L thumb         Family History:   Problem Relation Age of Onset    Heart Disease Mother     Pulmonary Fibrosis Mother     Heart Disease Father     Stroke Brother        Social History     Socioeconomic History    Marital status: SINGLE     Spouse name: Not on file    Number of children: Not on file    Years of education: Not on file    Highest education level: Not on file   Occupational History    Not on file   Tobacco Use    Smoking status: Never Smoker    Smokeless tobacco: Never Used   Substance and Sexual Activity    Alcohol use: Not Currently     Comment: once a month glass of wine    Drug use: No    Sexual activity: Not on file   Other Topics Concern    Not on file   Social History Narrative    Not on file     Social Determinants of Health     Financial Resource Strain:     Difficulty of Paying Living Expenses: Not on file   Food Insecurity:     Worried About Running Out of Food in the Last Year: Not on file    Debbi of Food in the Last Year: Not on file   Transportation Needs:     Lack of Transportation (Medical): Not on file    Lack of Transportation (Non-Medical): Not on file   Physical Activity:     Days of Exercise per Week: Not on file    Minutes of Exercise per Session: Not on file   Stress:     Feeling of Stress : Not on file   Social Connections:     Frequency of Communication with Friends and Family: Not on file    Frequency of Social Gatherings with Friends and Family: Not on file    Attends Anabaptist Services: Not on file    Active Member of [address] [place] [address] or Organizations: Not on file    Attends Club or Organization Meetings: Not on file    Marital Status: Not on file   Intimate Partner Violence:     Fear of Current or Ex-Partner: Not on file    Emotionally Abused: Not on file    Physically Abused: Not on file    Sexually Abused: Not on file   Housing Stability:     Unable to Pay for Housing in the Last Year: Not on file    Number of [place] in the Last Year: Not on file    Unstable Housing in the Last Year: Not on file         ALLERGIES: Bactrim [sulfamethoprim ds], Codeine, Morphine, Phenergan fortis, Sulfa (sulfonamide antibiotics), and Ultram [tramadol]    Review of Systems   Constitutional: Negative for activity change, appetite change, fever and unexpected weight change. HENT: Negative for congestion, sore throat and trouble swallowing. Eyes: Negative for visual disturbance. Respiratory: Positive for cough and shortness of breath. Cardiovascular: Positive for chest pain. Gastrointestinal: Positive for nausea. Negative for abdominal pain, constipation, diarrhea and vomiting.    Genitourinary: Negative for dysuria. Musculoskeletal: Negative for back pain, gait problem, myalgias and neck pain. Skin: Negative for rash. Neurological: Negative for dizziness, light-headedness and headaches. All other systems reviewed and are negative. Vitals:    06/14/22 1102   BP: 135/77   Pulse: 93   Resp: 16   Temp: 97.2 °F (36.2 °C)   SpO2: 97%            Physical Exam  Vitals and nursing note reviewed. Constitutional:       General: She is not in acute distress. Appearance: She is well-developed and normal weight. She is not ill-appearing, toxic-appearing or diaphoretic. Comments: Female, non-smoker   HENT:      Head: Normocephalic. Cardiovascular:      Rate and Rhythm: Normal rate and regular rhythm. Heart sounds: Normal heart sounds. Pulmonary:      Effort: Pulmonary effort is normal.      Breath sounds: Normal breath sounds. Chest:      Chest wall: Tenderness present. No mass or deformity. Abdominal:      General: Bowel sounds are normal.      Palpations: Abdomen is soft. Tenderness: There is no abdominal tenderness. Musculoskeletal:         General: Normal range of motion. Cervical back: Normal range of motion and neck supple. Right lower leg: No tenderness. No edema. Left lower leg: No tenderness. No edema. Lymphadenopathy:      Cervical: No cervical adenopathy. Skin:     General: Skin is warm and dry. Findings: No rash. Neurological:      Mental Status: She is alert. MDM       Procedures    EKG reveals normal sinus rhythm with a ventricular rate of 99; nonspecific T wave abnormality. Reviewed by Dr. Ainsley Schmidt. XR CHEST PA LAT    Result Date: 6/14/2022   No acute cardiopulmonary disease radiographically. .  .      Labs Reviewed   CBC WITH AUTOMATED DIFF - Abnormal; Notable for the following components:       Result Value    NEUTROPHILS 79 (*)     All other components within normal limits   METABOLIC PANEL, COMPREHENSIVE - Abnormal; Notable for the following components:    Glucose 107 (*)     BUN/Creatinine ratio 25 (*)     All other components within normal limits   NT-PRO BNP - Abnormal; Notable for the following components:    NT pro- (*)     All other components within normal limits   D DIMER   TROPONIN-HIGH SENSITIVITY   LIPASE     Patient has been reexamined and denies any further complaints of pain or discomfort. She was encouraged to keep a log of anything that makes the pain better or worse and have close follow-up with her PCP if symptoms continue. 1:24 PM  Patient's results and plan of care have been reviewed with her. Patient has verbally conveyed her understanding and agreement of her signs, symptoms, diagnosis, treatment and prognosis and additionally agrees to follow up as recommended or return to the Emergency Room should her condition change prior to follow-up. Discharge instructions have also been provided to the patient with some educational information regarding her diagnosis as well a list of reasons why she would want to return to the ER prior to her follow-up appointment should her condition change. Tierra Holm NP

## 2022-06-16 ENCOUNTER — HOSPITAL ENCOUNTER (OUTPATIENT)
Dept: MRI IMAGING | Age: 67
End: 2022-06-16
Attending: ORTHOPAEDIC SURGERY

## 2022-10-18 ENCOUNTER — OFFICE VISIT (OUTPATIENT)
Dept: CARDIOLOGY CLINIC | Age: 67
End: 2022-10-18
Payer: MEDICARE

## 2022-10-18 VITALS
HEART RATE: 87 BPM | BODY MASS INDEX: 24.86 KG/M2 | WEIGHT: 164 LBS | RESPIRATION RATE: 16 BRPM | SYSTOLIC BLOOD PRESSURE: 120 MMHG | DIASTOLIC BLOOD PRESSURE: 60 MMHG | HEIGHT: 68 IN | OXYGEN SATURATION: 95 %

## 2022-10-18 DIAGNOSIS — I49.3 PVC (PREMATURE VENTRICULAR CONTRACTION): ICD-10-CM

## 2022-10-18 DIAGNOSIS — R00.2 HEART PALPITATIONS: Primary | ICD-10-CM

## 2022-10-18 DIAGNOSIS — Z82.49 FAMILY HISTORY OF EARLY CAD: ICD-10-CM

## 2022-10-18 PROCEDURE — G8432 DEP SCR NOT DOC, RNG: HCPCS | Performed by: INTERNAL MEDICINE

## 2022-10-18 PROCEDURE — G8427 DOCREV CUR MEDS BY ELIG CLIN: HCPCS | Performed by: INTERNAL MEDICINE

## 2022-10-18 PROCEDURE — 99213 OFFICE O/P EST LOW 20 MIN: CPT | Performed by: INTERNAL MEDICINE

## 2022-10-18 PROCEDURE — G8399 PT W/DXA RESULTS DOCUMENT: HCPCS | Performed by: INTERNAL MEDICINE

## 2022-10-18 PROCEDURE — 1123F ACP DISCUSS/DSCN MKR DOCD: CPT | Performed by: INTERNAL MEDICINE

## 2022-10-18 PROCEDURE — G8536 NO DOC ELDER MAL SCRN: HCPCS | Performed by: INTERNAL MEDICINE

## 2022-10-18 PROCEDURE — 1101F PT FALLS ASSESS-DOCD LE1/YR: CPT | Performed by: INTERNAL MEDICINE

## 2022-10-18 PROCEDURE — G8420 CALC BMI NORM PARAMETERS: HCPCS | Performed by: INTERNAL MEDICINE

## 2022-10-18 PROCEDURE — 3017F COLORECTAL CA SCREEN DOC REV: CPT | Performed by: INTERNAL MEDICINE

## 2022-10-18 PROCEDURE — 1090F PRES/ABSN URINE INCON ASSESS: CPT | Performed by: INTERNAL MEDICINE

## 2022-10-18 RX ORDER — BISMUTH SUBSALICYLATE 262 MG
1 TABLET,CHEWABLE ORAL DAILY
COMMUNITY

## 2022-10-18 NOTE — LETTER
Patient:  Brigette Bassett   YOB: 1955  Date of Visit: 10/18/2022      Dear Sofy [place] [phone]  Via Fax: [phone]:      Ms. Chica Bustillo is a 70 yo F with h/o hypothyroid, h/o shoulder surgery on 7/2011, question h/o MV prolapse. 2014 stress test and an echo was normal.  Holter in 2012 that noted benign PVCs. 2/2020 stress echo was normal.    Since her last visit, she continues to do well. She denies any exertional chest pain. Her breathing has been stable. She does continue to have occasional palpitations, but she does think this is less than last year. She has not needed to be on a beta blocker or medical therapy for this. She did go to the emergency room back in June of this year for chest discomfort and workup there was unrevealing. This morning, she did have some chest discomfort as well that was substernal, nonexertional and she does think she did some lifting yesterday. She is compensated on exam with clear lungs and no lower extremity edema. Fam Hx. Father 47 yo. Brother carotid dx age 72. Soc Hx. Works . No tobacco  Assessment and Plan:   1. Palpitations. Holter in the past noted benign PVCs and her palpitations are less than before. We did talk in the past about low dose beta blocker if needed, but not indicated at this time and will have her follow back in one year. 2. Chest discomfort. Noncardiac. If in the future her symptoms become more exertional, can always consider a stress test and we discussed this. She is active and not having symptoms when she exerts herself. 3. Family history of early coronary artery disease. We did talk about coronary calcium score in the past, but she is holistic and not interested in taking medications. I do not think there would be a benefit to obtaining a coronary calcium score because if it is elevated, would recommend a statin.  Otherwise she is addressing her risk factors and exercising regularly. If you have questions, please do not hesitate to call me.      Sincerely,      Ronit Ferreira MD

## 2022-11-08 ENCOUNTER — OFFICE VISIT (OUTPATIENT)
Dept: SURGERY | Age: 67
End: 2022-11-08
Payer: MEDICARE

## 2022-11-08 VITALS
HEIGHT: 67 IN | SYSTOLIC BLOOD PRESSURE: 110 MMHG | DIASTOLIC BLOOD PRESSURE: 73 MMHG | BODY MASS INDEX: 25.58 KG/M2 | HEART RATE: 84 BPM | WEIGHT: 163 LBS

## 2022-11-08 DIAGNOSIS — Z13.9 SCREENING DUE: ICD-10-CM

## 2022-11-08 DIAGNOSIS — N64.4 MASTODYNIA OF LEFT BREAST: Primary | ICD-10-CM

## 2022-11-08 PROCEDURE — 76642 ULTRASOUND BREAST LIMITED: CPT | Performed by: SURGERY

## 2022-11-08 PROCEDURE — G8432 DEP SCR NOT DOC, RNG: HCPCS | Performed by: SURGERY

## 2022-11-08 PROCEDURE — 3017F COLORECTAL CA SCREEN DOC REV: CPT | Performed by: SURGERY

## 2022-11-08 PROCEDURE — G8399 PT W/DXA RESULTS DOCUMENT: HCPCS | Performed by: SURGERY

## 2022-11-08 PROCEDURE — G8536 NO DOC ELDER MAL SCRN: HCPCS | Performed by: SURGERY

## 2022-11-08 PROCEDURE — G8427 DOCREV CUR MEDS BY ELIG CLIN: HCPCS | Performed by: SURGERY

## 2022-11-08 PROCEDURE — G8419 CALC BMI OUT NRM PARAM NOF/U: HCPCS | Performed by: SURGERY

## 2022-11-08 PROCEDURE — 99203 OFFICE O/P NEW LOW 30 MIN: CPT | Performed by: SURGERY

## 2022-11-08 PROCEDURE — 1090F PRES/ABSN URINE INCON ASSESS: CPT | Performed by: SURGERY

## 2022-11-08 PROCEDURE — 1101F PT FALLS ASSESS-DOCD LE1/YR: CPT | Performed by: SURGERY

## 2022-11-08 PROCEDURE — 1123F ACP DISCUSS/DSCN MKR DOCD: CPT | Performed by: SURGERY

## 2022-11-08 NOTE — PROGRESS NOTES
HISTORY OF PRESENT ILLNESS  Kb Chris is a 69 y.o. female. HPI  NEW patient consult referred by for LEFT breast pain for 2 months. Outer breast, hurts most of the time. Worst on Saturday, today not as bad. LEFT breast biopsy  and 2022    Partial hysterectomy in the 70's , complete in the 's  Cervical cancer   Melanoma in situ right knee 2020   Facial skin cancers      Family History:  Maternal aunt  from breast cancer in her 66's       Mammograms at [facility] 10/21/22, BIRADS 4  Recent LEFT breast biopsy at [facility], benign per pt    Past Medical History:   Diagnosis Date    Arthritis     Cervical ca ([address]     Hyperlipidemia     Hypothyroid     Leg swelling     Ocular migraine     Psychiatric disorder     depression    Skipped beats     Snoring      Past Surgical History:   Procedure Laterality Date    HX HYSTERECTOMY      HX ORTHOPAEDIC      R thumb joint replacement    HX ORTHOPAEDIC      L thumb      OB History    No obstetric history on file. Obstetric Comments   Menarche 13, LMP , # of children 0, age of 1st delivery -, Hysterectomy/oophorectomy yes/yes, Breast bx yes, history of breast feeding no, BCP no, Hormone therapy yes              Family History   Problem Relation Age of Onset    Heart Disease Mother     Pulmonary Fibrosis Mother     Heart Disease Father     Stroke Brother      Social History     Tobacco Use    Smoking status: Never    Smokeless tobacco: Never   Substance Use Topics    Alcohol use: Not Currently     Comment: once a month glass of wine      Prior to Admission medications    Medication Sig Start Date End Date Taking? Authorizing Provider   multivitamin (ONE A DAY) tablet Take 1 Tablet by mouth daily. Yes Provider, Historical   lidocaine (Lidoderm) 5 % Apply patch to the affected area for 12 hours a day and remove for 12 hours a day.   Patient not taking: Reported on 10/18/2022 5/21/22   Misael Presley PA   methylPREDNISolone (Medrol, Braulio,) 4 mg tablet Follow instructions on packaging  Patient not taking: Reported on 10/18/2022 5/21/22   Kt Presley PA      Allergies   Allergen Reactions    Bactrim [Sulfamethoprim Ds] Unknown (comments)    Codeine Nausea Only    Morphine Itching    Phenergan Fortis Other (comments)     Parkinson like symptoms    Sulfa (Sulfonamide Antibiotics) Unknown (comments)    Ultram [Tramadol] Nausea Only     ROS    Physical Exam  Chest:   Breasts:     Breasts are symmetrical.      Right: No inverted nipple, mass, nipple discharge, skin change or tenderness. Left: Tenderness (Lateral breast) present. No inverted nipple, mass, nipple discharge or skin change. Lymphadenopathy:      Upper Body:      Right upper body: No supraclavicular or axillary adenopathy. Left upper body: No supraclavicular or axillary adenopathy. BREAST ULTRASOUND  Indication: left breast pain lateral breast  Technique: The area was scanned using a high-frequency linear-array near-field transducer  Findings: No abnormal mass, lesion, or shadowing noted. No cysts  Impression: Normal dense breast tissue   Disposition: No worrisome finding on ultrasound   ASSESSMENT and PLAN    ICD-10-CM ICD-9-CM    1. Mastodynia of left breast  N64.4 611.71       Total time spent on chart review and patient visit: 30 minutes      LEFT breast pain  No worrisome finding on physical exam, mammogram or ultrasound  History and symptoms c/w costochondritis  Recommend heat and ibuprofen for pain  Pain typically resolves spontaneously and can last up to 6 months  Mammogram due now. Pt will schedule.     Follow up if any change on physical exam or mammogram

## 2022-11-08 NOTE — LETTER
11/8/2022    Patient: Runell Sever   YOB: 1955   Date of Visit: 11/8/2022     ORALIA Schultz  [address] [facility] [address]  Via Fax: [phone]    Dear ORALIA Schultz,      Thank you for referring Ms. Michael Nelson to [facility] for evaluation. My notes for this consultation are attached. If you have questions, please do not hesitate to call me. I look forward to following your patient along with you.       Sincerely,    Poornima Taylor MD

## 2022-11-21 ENCOUNTER — DOCUMENTATION ONLY (OUTPATIENT)
Dept: SURGERY | Age: 67
End: 2022-11-21

## 2022-11-21 NOTE — PROGRESS NOTES
We received a breast imaging CD from [facility]. It was given to imaging at [place] to be loaded into PACS.

## 2023-01-02 ENCOUNTER — HOSPITAL ENCOUNTER (EMERGENCY)
Age: 68
Discharge: HOME | End: 2023-01-02
Attending: EMERGENCY MEDICINE
Payer: MEDICARE

## 2023-01-02 ENCOUNTER — APPOINTMENT (OUTPATIENT)
Dept: VASCULAR SURGERY | Age: 68
End: 2023-01-02
Attending: EMERGENCY MEDICINE
Payer: MEDICARE

## 2023-01-02 VITALS
RESPIRATION RATE: 16 BRPM | TEMPERATURE: 98.3 F | OXYGEN SATURATION: 97 % | HEART RATE: 93 BPM | SYSTOLIC BLOOD PRESSURE: 134 MMHG | DIASTOLIC BLOOD PRESSURE: 71 MMHG

## 2023-01-02 DIAGNOSIS — M54.41 ACUTE MIDLINE LOW BACK PAIN WITH RIGHT-SIDED SCIATICA: Primary | ICD-10-CM

## 2023-01-02 PROCEDURE — 93971 EXTREMITY STUDY: CPT

## 2023-01-02 PROCEDURE — 99284 EMERGENCY DEPT VISIT MOD MDM: CPT

## 2023-01-02 RX ORDER — IBUPROFEN 600 MG/1
600 TABLET ORAL
Qty: 20 TABLET | Refills: 0 | Status: SHIPPED | OUTPATIENT
Start: 2023-01-02

## 2023-01-02 NOTE — ED PROVIDER NOTES
Emerson Payne is a 70 yo F with pain in her right lateral calf and low back. She woke this morning with the pain. It is not increased with movement. She denies recent travel, smoking or h/o DVT. She thinks it may be sciatica and went to a chiropractor earlier today but wanted to be sure it was not a DVT. Past Medical History:   Diagnosis Date    Arthritis     Cervical ca (HCC)     Hyperlipidemia     Hypothyroid     Leg swelling     Ocular migraine     Psychiatric disorder     depression    Skipped beats     Snoring        Past Surgical History:   Procedure Laterality Date    HX HYSTERECTOMY      HX ORTHOPAEDIC      R thumb joint replacement    HX ORTHOPAEDIC      L thumb         Family History:   Problem Relation Age of Onset    Heart Disease Mother     Pulmonary Fibrosis Mother     Heart Disease Father     Stroke Brother        Social History     Socioeconomic History    Marital status: SINGLE     Spouse name: Not on file    Number of children: Not on file    Years of education: Not on file    Highest education level: Not on file   Occupational History    Not on file   Tobacco Use    Smoking status: Never    Smokeless tobacco: Never   Substance and Sexual Activity    Alcohol use: Not Currently     Comment: once a month glass of wine    Drug use: No    Sexual activity: Not on file   Other Topics Concern    Not on file   Social History Narrative    Not on file     Social Determinants of Health     Financial Resource Strain: Not on file   Food Insecurity: Not on file   Transportation Needs: Not on file   Physical Activity: Not on file   Stress: Not on file   Social Connections: Not on file   Intimate Partner Violence: Not on file   Housing Stability: Not on file         ALLERGIES: Bactrim [sulfamethoprim ds], Codeine, Morphine, Phenergan fortis, Sulfa (sulfonamide antibiotics), and Ultram [tramadol]    Review of Systems   Constitutional:  Negative for fever. HENT:  Negative for sore throat.     Eyes: Negative for visual disturbance. Respiratory:  Negative for cough. Cardiovascular:  Negative for chest pain. Gastrointestinal:  Negative for abdominal pain. Genitourinary:  Negative for dysuria. Musculoskeletal:  Positive for back pain (mild). Right calf     Skin:  Negative for rash. Neurological:  Negative for headaches. Vitals:    01/02/23 1613   BP: 134/71   Pulse: 93   Resp: 16   Temp: 98.3 °F (36.8 °C)   SpO2: 97%            Physical Exam  Vitals and nursing note reviewed. Constitutional:       General: She is not in acute distress. Appearance: She is well-developed. HENT:      Head: Normocephalic and atraumatic. Mouth/Throat:      Mouth: Mucous membranes are moist.   Eyes:      Extraocular Movements: Extraocular movements intact. Conjunctiva/sclera: Conjunctivae normal.   Neck:      Trachea: Phonation normal.   Cardiovascular:      Rate and Rhythm: Normal rate. Pulmonary:      Effort: Pulmonary effort is normal. No respiratory distress. Abdominal:      General: There is no distension. Musculoskeletal:         General: No tenderness. Normal range of motion. Cervical back: Normal range of motion. Lumbar back: No spasms or tenderness. Right lower leg: No edema. Left lower leg: No edema. Skin:     General: Skin is warm and dry. Neurological:      General: No focal deficit present. Mental Status: She is alert. She is not disoriented. Motor: No abnormal muscle tone. Medical Decision Making  Amount and/or Complexity of Data Reviewed  ECG/medicine tests: ordered. Risk  Prescription drug management. 6:30 PM  Vascular US negative for DVT. Patient states she had XR earlier today at chiropractor, will defer further imaging. Discussed treatment with NSAIDS.       Procedures

## 2023-01-02 NOTE — ED TRIAGE NOTES
Triage: Pt arrives ambulatory from home with CC of right calf pain that started when she woke up this morning. She denies recent travel, hormonal therapy, smoking, or hx of dvt.

## 2023-03-28 ENCOUNTER — HOSPITAL ENCOUNTER (EMERGENCY)
Dept: CT IMAGING | Age: 68
Discharge: HOME | End: 2023-03-28
Attending: EMERGENCY MEDICINE
Payer: MEDICARE

## 2023-03-28 ENCOUNTER — HOSPITAL ENCOUNTER (EMERGENCY)
Age: 68
Discharge: HOME | End: 2023-03-29
Attending: EMERGENCY MEDICINE
Payer: MEDICARE

## 2023-03-28 DIAGNOSIS — G43.901 MIGRAINE WITH STATUS MIGRAINOSUS, NOT INTRACTABLE, UNSPECIFIED MIGRAINE TYPE: Primary | ICD-10-CM

## 2023-03-28 LAB
ALBUMIN SERPL-MCNC: 3.8 G/DL (ref 3.5–5)
ALBUMIN/GLOB SERPL: 1.2 (ref 1.1–2.2)
ALP SERPL-CCNC: 77 U/L (ref 45–117)
ALT SERPL-CCNC: 24 U/L (ref 12–78)
ANION GAP SERPL CALC-SCNC: 2 MMOL/L (ref 5–15)
AST SERPL-CCNC: 20 U/L (ref 15–37)
BASOPHILS # BLD: 0 K/UL (ref 0–0.1)
BASOPHILS NFR BLD: 1 % (ref 0–1)
BILIRUB SERPL-MCNC: 0.3 MG/DL (ref 0.2–1)
BUN SERPL-MCNC: 29 MG/DL (ref 6–20)
BUN/CREAT SERPL: 40 (ref 12–20)
CALCIUM SERPL-MCNC: 9.4 MG/DL (ref 8.5–10.1)
CHLORIDE SERPL-SCNC: 110 MMOL/L (ref 97–108)
CO2 SERPL-SCNC: 27 MMOL/L (ref 21–32)
COMMENT, HOLDF: NORMAL
CREAT SERPL-MCNC: 0.72 MG/DL (ref 0.55–1.02)
CRP SERPL-MCNC: <0.29 MG/DL (ref 0–0.6)
DIFFERENTIAL METHOD BLD: NORMAL
EOSINOPHIL # BLD: 0.1 K/UL (ref 0–0.4)
EOSINOPHIL NFR BLD: 2 % (ref 0–7)
ERYTHROCYTE [DISTWIDTH] IN BLOOD BY AUTOMATED COUNT: 12.3 % (ref 11.5–14.5)
ERYTHROCYTE [SEDIMENTATION RATE] IN BLOOD: 6 MM/HR (ref 0–30)
GLOBULIN SER CALC-MCNC: 3.1 G/DL (ref 2–4)
GLUCOSE SERPL-MCNC: 105 MG/DL (ref 65–100)
HCT VFR BLD AUTO: 39.6 % (ref 35–47)
HGB BLD-MCNC: 12.7 G/DL (ref 11.5–16)
IMM GRANULOCYTES # BLD AUTO: 0 K/UL (ref 0–0.04)
IMM GRANULOCYTES NFR BLD AUTO: 0 % (ref 0–0.5)
LYMPHOCYTES # BLD: 1.5 K/UL (ref 0.8–3.5)
LYMPHOCYTES NFR BLD: 25 % (ref 12–49)
MCH RBC QN AUTO: 30.4 PG (ref 26–34)
MCHC RBC AUTO-ENTMCNC: 32.1 G/DL (ref 30–36.5)
MCV RBC AUTO: 94.7 FL (ref 80–99)
MONOCYTES # BLD: 0.4 K/UL (ref 0–1)
MONOCYTES NFR BLD: 6 % (ref 5–13)
NEUTS SEG # BLD: 4.1 K/UL (ref 1.8–8)
NEUTS SEG NFR BLD: 66 % (ref 32–75)
NRBC # BLD: 0 K/UL (ref 0–0.01)
NRBC BLD-RTO: 0 PER 100 WBC
PLATELET # BLD AUTO: 234 K/UL (ref 150–400)
PMV BLD AUTO: 9.1 FL (ref 8.9–12.9)
POTASSIUM SERPL-SCNC: 3.6 MMOL/L (ref 3.5–5.1)
PROT SERPL-MCNC: 6.9 G/DL (ref 6.4–8.2)
RBC # BLD AUTO: 4.18 M/UL (ref 3.8–5.2)
SAMPLES BEING HELD,HOLD: NORMAL
SODIUM SERPL-SCNC: 139 MMOL/L (ref 136–145)
WBC # BLD AUTO: 6.1 K/UL (ref 3.6–11)

## 2023-03-28 PROCEDURE — 86140 C-REACTIVE PROTEIN: CPT

## 2023-03-28 PROCEDURE — 85652 RBC SED RATE AUTOMATED: CPT

## 2023-03-28 PROCEDURE — 36415 COLL VENOUS BLD VENIPUNCTURE: CPT

## 2023-03-28 PROCEDURE — 85025 COMPLETE CBC W/AUTO DIFF WBC: CPT

## 2023-03-28 PROCEDURE — 70450 CT HEAD/BRAIN W/O DYE: CPT

## 2023-03-28 PROCEDURE — 80053 COMPREHEN METABOLIC PANEL: CPT

## 2023-03-28 PROCEDURE — 74011000636 HC RX REV CODE- 636: Performed by: STUDENT IN AN ORGANIZED HEALTH CARE EDUCATION/TRAINING PROGRAM

## 2023-03-28 PROCEDURE — 99285 EMERGENCY DEPT VISIT HI MDM: CPT

## 2023-03-28 PROCEDURE — 70496 CT ANGIOGRAPHY HEAD: CPT

## 2023-03-28 RX ORDER — BUTALBITAL, ACETAMINOPHEN AND CAFFEINE 300; 40; 50 MG/1; MG/1; MG/1
1 CAPSULE ORAL
Qty: 12 CAPSULE | Refills: 0 | Status: SHIPPED | OUTPATIENT
Start: 2023-03-28 | End: 2023-03-29 | Stop reason: ALTCHOICE

## 2023-03-28 RX ADMIN — IOPAMIDOL 100 ML: 755 INJECTION, SOLUTION INTRAVENOUS at 22:39

## 2023-03-29 ENCOUNTER — OFFICE VISIT (OUTPATIENT)
Dept: NEUROLOGY | Age: 68
End: 2023-03-29
Payer: MEDICARE

## 2023-03-29 VITALS
DIASTOLIC BLOOD PRESSURE: 78 MMHG | RESPIRATION RATE: 14 BRPM | SYSTOLIC BLOOD PRESSURE: 127 MMHG | HEART RATE: 82 BPM | OXYGEN SATURATION: 99 % | TEMPERATURE: 98 F

## 2023-03-29 VITALS
WEIGHT: 163 LBS | HEART RATE: 92 BPM | DIASTOLIC BLOOD PRESSURE: 77 MMHG | HEIGHT: 67 IN | RESPIRATION RATE: 17 BRPM | SYSTOLIC BLOOD PRESSURE: 121 MMHG | BODY MASS INDEX: 25.58 KG/M2 | OXYGEN SATURATION: 97 %

## 2023-03-29 DIAGNOSIS — G43.109 MIGRAINE WITH AURA AND WITHOUT STATUS MIGRAINOSUS, NOT INTRACTABLE: ICD-10-CM

## 2023-03-29 DIAGNOSIS — G43.909 EPISODIC MIGRAINE: Primary | ICD-10-CM

## 2023-03-29 PROCEDURE — 1123F ACP DISCUSS/DSCN MKR DOCD: CPT

## 2023-03-29 PROCEDURE — 3017F COLORECTAL CA SCREEN DOC REV: CPT

## 2023-03-29 PROCEDURE — G8417 CALC BMI ABV UP PARAM F/U: HCPCS

## 2023-03-29 PROCEDURE — 99215 OFFICE O/P EST HI 40 MIN: CPT

## 2023-03-29 PROCEDURE — G8399 PT W/DXA RESULTS DOCUMENT: HCPCS

## 2023-03-29 PROCEDURE — 1101F PT FALLS ASSESS-DOCD LE1/YR: CPT

## 2023-03-29 PROCEDURE — 1090F PRES/ABSN URINE INCON ASSESS: CPT

## 2023-03-29 PROCEDURE — G8536 NO DOC ELDER MAL SCRN: HCPCS

## 2023-03-29 PROCEDURE — G8432 DEP SCR NOT DOC, RNG: HCPCS

## 2023-03-29 PROCEDURE — G8427 DOCREV CUR MEDS BY ELIG CLIN: HCPCS

## 2023-03-29 NOTE — PROGRESS NOTES
Chief Complaint   Patient presents with    Follow-up    Migraine   Visit Vitals  /77 (BP 1 Location: Right arm, BP Patient Position: Sitting, BP Cuff Size: Adult)   Pulse 92   Resp 17   Ht 5' 7\" (1.702 m)   Wt 163 lb (73.9 kg)   SpO2 97%   BMI 25.53 kg/m²

## 2023-03-29 NOTE — ED PROVIDER NOTES
53-year-old female with history as below, presents to the emergency department noting a 4-day history of right-sided headache predominantly around her eye that radiates across the right side of her head down into her neck. She denies any trauma to the area. She denies any significant light sensitivity or nausea or vomiting. Denies any vision changes, difficulty speaking, walking, numbness or weakness. No fever, chills, URI symptoms, or any other medical concerns. She states that she has had waxing and waning pain similar to this for 6 weeks but it has been constant for the last 4 days. She has a history of migraines but states that this feels different than her prior migraines. She does not follow with a neurologist.  She has not taken anything for her symptoms and she declines any medications in the ED stating that she is very sensitive to medications. Neck Pain   Associated symptoms include headaches. Pertinent negatives include no photophobia, no chest pain, no numbness and no weakness.       Past Medical History:   Diagnosis Date    Arthritis     Cervical ca ([address]     Hyperlipidemia     Hypothyroid     Leg swelling     Ocular migraine     Psychiatric disorder     depression    Skipped beats     Snoring        Past Surgical History:   Procedure Laterality Date    HX HYSTERECTOMY      HX ORTHOPAEDIC      R thumb joint replacement    HX ORTHOPAEDIC      L thumb         Family History:   Problem Relation Age of Onset    Heart Disease Mother     Pulmonary Fibrosis Mother     Heart Disease Father     Stroke Brother        Social History     Socioeconomic History    Marital status: SINGLE     Spouse name: Not on file    Number of children: Not on file    Years of education: Not on file    Highest education level: Not on file   Occupational History    Not on file   Tobacco Use    Smoking status: Never    Smokeless tobacco: Never   Substance and Sexual Activity    Alcohol use: Not Currently     Comment: once a month glass of wine    Drug use: No    Sexual activity: Not on file   Other Topics Concern    Not on file   Social History Narrative    Not on file     Social Determinants of Health     Financial Resource Strain: Not on file   Food Insecurity: Not on file   Transportation Needs: Not on file   Physical Activity: Not on file   Stress: Not on file   Social Connections: Not on file   Intimate Partner Violence: Not on file   Housing Stability: Not on file         ALLERGIES: Bactrim [sulfamethoprim ds], Codeine, Morphine, Phenergan fortis, Sulfa (sulfonamide antibiotics), and Ultram [tramadol]    Review of Systems   Constitutional:  Negative for activity change, appetite change, chills and fever. HENT:  Negative for congestion, rhinorrhea, sinus pressure, sneezing and sore throat. Eyes:  Negative for photophobia and visual disturbance. Respiratory:  Negative for cough and shortness of breath. Cardiovascular:  Negative for chest pain. Gastrointestinal:  Negative for abdominal pain, blood in stool, constipation, diarrhea, nausea and vomiting. Genitourinary:  Negative for difficulty urinating, dysuria, flank pain, frequency, hematuria, menstrual problem, urgency, vaginal bleeding and vaginal discharge. Musculoskeletal:  Positive for neck pain. Negative for arthralgias, back pain and myalgias. Skin:  Negative for rash and wound. Neurological:  Positive for headaches. Negative for syncope, facial asymmetry, speech difficulty, weakness, light-headedness and numbness. Psychiatric/Behavioral:  Negative for self-injury and suicidal ideas. All other systems reviewed and are negative. Vitals:    03/28/23 2051   BP: (!) 171/85   Pulse: 86   Resp: 18   Temp: 97.8 °F (36.6 °C)   SpO2: 98%            Physical Exam  Vitals and nursing note reviewed. Constitutional:       General: She is not in acute distress. Appearance: Normal appearance. She is well-developed. She is not diaphoretic.       Comments: Very well-appearing, no acute distress, ambulatory without difficulty and speaking in full sentences. HENT:      Head: Normocephalic and atraumatic. Comments: No tenderness or redness or swelling over the temporal artery suggestive of temporal arteritis. Nose: Nose normal.   Eyes:      Extraocular Movements: Extraocular movements intact. Conjunctiva/sclera: Conjunctivae normal.      Pupils: Pupils are equal, round, and reactive to light. Cardiovascular:      Rate and Rhythm: Normal rate and regular rhythm. Heart sounds: Normal heart sounds. Pulmonary:      Effort: Pulmonary effort is normal.      Breath sounds: Normal breath sounds. Abdominal:      General: There is no distension. Palpations: Abdomen is soft. Tenderness: There is no abdominal tenderness. Musculoskeletal:         General: No tenderness. Cervical back: Neck supple. Skin:     General: Skin is warm and dry. Neurological:      General: No focal deficit present. Mental Status: She is alert and oriented to person, place, and time. Cranial Nerves: No cranial nerve deficit. Sensory: No sensory deficit. Motor: No weakness. Coordination: Coordination normal.      Comments: Intact sensation, 5/5 strength in all 4 extremities, intact finger to nose, neg pronator drift, fluent speech, CN intact. Medical Decision Making  Amount and/or Complexity of Data Reviewed  Labs: ordered. Radiology: ordered. 47-year-old female presents with migraine type headache right side of her head for 4 days. She is neuro intact, as above. History of migraines. She was offered migraine cocktail in the ED but patient declined. Labs returned very reassuringly showing no significant abnormalities. No evidence of inflammatory vasculitis. CT head and CTA head and neck shows no acute abnormalities  She was again offered migraine cocktail but patient declined.   She agreed to accept a prescription for Fioricet that she could try to alleviate her symptoms and she was recommended neurology follow-up for further evaluation. Return precautions were given for worsening or concerns. This plan was discussed with the patient at the bedside and she stated with understanding and agreement. Please note that this dictation was completed with [employer], the computer voice recognition software. Quite often unanticipated grammatical, syntax, homophones, and other interpretive errors are inadvertently transcribed by the computer software. Please disregard these errors. Please excuse any errors that have escaped final proofreading.     Procedures

## 2023-03-29 NOTE — PROGRESS NOTES
Chief Complaint   Patient presents with    Follow-up    Migraine         HPI    Mrs Elin Fu is a 77year old female here for follow up. She is a new patient for me. She was last seen by Dr Renee Patrick on 11/19/21 for migraines. She was reporting getting migraines one every 2-3 weeks lasting a couple of house. She has a history of a right ICA aneurysm. CT head was normal. MRI brain normal. She was given some samples of Nurtec in the office to try for her pain. If it worked well for her she was supposed to let Dr Renee Patrick know so he could order some. She was in the ED last night for cervicalgia pain and migraine. Head CT normal. She reports that the pain started in neck months ago, went to her PCP and they recommended PT but she never went. The pain started increasing over the week and last couple days were worse then the pain spread to  behind the right eye into the temple. Then last night it was worse so she went into the ED. Denies vision changed or weakness. She did not want the IV cocktail. They gave her Fioricet but she did not get it filled. She still has some pain today but it has subsided. She reports not trying the Nurtec that Dr Renee Patrick gave her. She hasn't had a migraine for years and the last one she had was a occular migraine with bright white spots. BACKGROUND  Patient was seen by my partner, Dr. Naomie Pinedo September 25, 2018 for headache in the occipital region pulsating lasting a few minutes then subsiding. No changes in vision speech or any focal motor or sensory deficits. She would go to the emergency department. Her examination was unremarkable. His impression was that of new daily persistent dull headache in the occipital region but she was also having tinnitus of a pulsatile nature so sent her for an MRI and an MRA. Note with Dr. Katie Dutton June 2019 noting several small right ICA aneurysm with a less than 1% annual cumulative risk of rupture with no treatment recommended.   Annual MRI surveillance. Review of Systems   Eyes:  Negative for double vision. Musculoskeletal:  Positive for neck pain. Neurological:  Positive for headaches. Negative for weakness. Past Medical History:   Diagnosis Date    Arthritis     Cervical ca (HCC)     Hyperlipidemia     Hypothyroid     Leg swelling     Ocular migraine     Psychiatric disorder     depression    Skipped beats     Snoring      Family History   Problem Relation Age of Onset    Heart Disease Mother     Pulmonary Fibrosis Mother     Heart Disease Father     Stroke Brother      Social History     Socioeconomic History    Marital status: SINGLE     Spouse name: Not on file    Number of children: Not on file    Years of education: Not on file    Highest education level: Not on file   Occupational History    Not on file   Tobacco Use    Smoking status: Never    Smokeless tobacco: Never   Vaping Use    Vaping Use: Never used   Substance and Sexual Activity    Alcohol use: Not Currently     Comment: once a month glass of wine    Drug use: No    Sexual activity: Not on file   Other Topics Concern    Not on file   Social History Narrative    Not on file     Social Determinants of Health     Financial Resource Strain: Not on file   Food Insecurity: Not on file   Transportation Needs: Not on file   Physical Activity: Not on file   Stress: Not on file   Social Connections: Not on file   Intimate Partner Violence: Not on file   Housing Stability: Not on file     Allergies   Allergen Reactions    Bactrim [Sulfamethoprim Ds] Unknown (comments)    Codeine Nausea Only    Morphine Itching    Phenergan Fortis Other (comments)     Parkinson like symptoms    Sulfa (Sulfonamide Antibiotics) Unknown (comments)    Ultram [Tramadol] Nausea Only         Current Outpatient Medications   Medication Sig    rimegepant (NURTEC) 75 mg disintegrating tablet Take 1 Tablet by mouth as needed for Migraine.     ibuprofen (MOTRIN) 600 mg tablet Take 1 Tablet by mouth every eight (8) hours as needed for Pain.    multivitamin (ONE A DAY) tablet Take 1 Tablet by mouth daily. No current facility-administered medications for this visit. Neurologic Exam     Mental Status   Oriented to person, place, and time. Speech: speech is normal   Level of consciousness: alert    Cranial Nerves   Cranial nerves II through XII intact. CN III, IV, VI   Pupils are equal, round, and reactive to light. CN VII   Facial expression full, symmetric. Motor Exam   Muscle bulk: normal    Strength   Strength 5/5 throughout. Sensory Exam   Light touch normal.     Gait, Coordination, and Reflexes     Gait  Gait: normal    Visit Vitals  /77 (BP 1 Location: Right arm, BP Patient Position: Sitting, BP Cuff Size: Adult)   Pulse 92   Resp 17   Ht 5' 7\" (1.702 m)   Wt 163 lb (73.9 kg)   SpO2 97%   BMI 25.53 kg/m²         CT Results (maximum last 3): Results from [place] encounter on 03/28/23    CTA HEAD NECK W CONT    Narrative  EXAM:  CTA HEAD NECK W CONT    INDICATION:   headache, necl pain    COMPARISON:  CTA head and neck from 3/30/2021. CONTRAST: 100 mL of Isovue-370    TECHNIQUE: Unenhanced  images were obtained to localize the volume for  acquisition. Multislice helical axial CT angiography was performed from the  aortic arch to the top of the head during uneventful rapid bolus intravenous  contrast administration. Coronal and sagittal reformations and 3D post  processing was performed. CT dose reduction was achieved through use of a  standardized protocol tailored for this examination and automatic exposure  control for dose modulation. FINDINGS:    DELAYED CONTRAST-ENHANCED HEAD CT:    Generalized volume loss, mild. The ventricles and sulci are proportional. There  is no midline shift or mass effect. No large territory ischemia,  intraparenchymal hemorrhage, or extra-axial collections. Gray-white  differentiation is maintained. The basal cisterns are clear.  The orbits,  paranasal sinuses, and mastoid air cells are unremarkable. CTA NECK:    Great vessels: Patent arch and great vessel origins without stenosis. Right subclavian artery: Patent    Left subclavian artery: Patent    Right common carotid artery: Patent    Left common carotid artery: Patent    Cervical right internal carotid artery: Patent with no significant stenosis by  NASCET criteria. Cervical left internal carotid artery: Patent with no significant stenosis by  NASCET criteria. Right vertebral artery: Patent    Left vertebral artery: Patent    Stable nonspecific subsegmental mosaic attenuation lung apices. The cervical  soft tissues are unremarkable. No suspicious osseous lesions. CTA HEAD:    Right cavernous internal carotid artery: Patent    Left cavernous internal carotid artery: Patent    Anterior cerebral arteries: Patent    Anterior communicating artery: Patent    Right middle cerebral artery: Patent    Left middle cerebral artery: Patent    Posterior communicating arteries: Patent    Posterior cerebral arteries: Patent    Basilar artery: Patent    Distal vertebral arteries: Patent    There is no intracranial aneurysm. The dural venous sinuses are patent. Impression  1. No evidence of large vessel occlusion or significant flow-limiting stenosis. CT HEAD WO CONT    Narrative  EXAM: CT HEAD WO CONT    INDICATION: HA, neck pain    COMPARISON: September 20, 2021. [place] [facility] CONTRAST: None. TECHNIQUE: Unenhanced CT of the head was performed using 5 mm images. Brain and  bone windows were generated. Coronal and sagittal reformats. CT dose reduction  was achieved through use of a standardized protocol tailored for this  examination and automatic exposure control for dose modulation. FINDINGS:  There is no extra-axial fluid collection, hemorrhage shift or masses. Impression  No acute findings. MRI Results (maximum last 3):       Follow-up and Dispositions    Return in about 6 months (around 9/29/2023). up    Assessment and Plan   Diagnoses and all orders for this visit:    1. Episodic migraine  -     rimegepant (NURTEC) 75 mg disintegrating tablet; Take 1 Tablet by mouth as needed for Migraine. 2. Migraine with aura and without status migrainosus, not intractable  -     rimegepant (NURTEC) 75 mg disintegrating tablet; Take 1 Tablet by mouth as needed for Migraine. 77year old female with episodic tension migraine. She does not get them frequently, but when she does its hard for her to get rid of it. She doesn't take anything besides some Advil for the pain. She doesn't like to take a lot of medication. I don't think that she would benefit from a preventative right now and I don't think she would be compliant with it either. I would like to try her on a abortive therapy. She can not have any triptans because of her high stroke risk factors. I would like her to try Nurtec PRN for pain. I explained side effects and how to use this med. She understands. ED precautions discussed. She will let me know how she likes it. I also suggested some OTC Magnesium daily for prevention. Its natural and safe way to help headaches. She agrees and will try it. I will see her back in 6 months. I spent 45 minutes of time today reviewing the medical record and notes, imaging, examining the patient, and face-to-face time, patient/family teaching and medication side effects, and time spent completing documentation.       EMIL Mancilla

## 2023-03-30 ENCOUNTER — TELEPHONE (OUTPATIENT)
Dept: NEUROLOGY | Age: 68
End: 2023-03-30

## 2023-03-31 ENCOUNTER — TELEPHONE (OUTPATIENT)
Dept: NEUROLOGY | Age: 68
End: 2023-03-31

## 2023-04-10 ENCOUNTER — TELEPHONE (OUTPATIENT)
Dept: NEUROLOGY | Age: 68
End: 2023-04-10

## 2023-04-17 NOTE — TELEPHONE ENCOUNTER
Re: Luis Mcclure with [employer] rep and they got a paid claim but as of 04/13/23 pt advised [employer] she was not taking med and another provider discontinued med. Sent update to nurse to confirm, pt might not have recognized [employer].

## 2023-04-18 ENCOUNTER — TELEPHONE (OUTPATIENT)
Dept: NEUROLOGY | Age: 68
End: 2023-04-18

## 2023-04-23 DIAGNOSIS — Z13.9 SCREENING DUE: Primary | ICD-10-CM

## 2023-04-23 DIAGNOSIS — Z12.31 VISIT FOR SCREENING MAMMOGRAM: Primary | ICD-10-CM

## 2023-05-30 ENCOUNTER — OFFICE VISIT (OUTPATIENT)
Age: 68
End: 2023-05-30
Payer: MEDICARE

## 2023-05-30 VITALS — BODY MASS INDEX: 25.11 KG/M2 | HEIGHT: 67 IN | WEIGHT: 160 LBS

## 2023-05-30 DIAGNOSIS — N64.4 MASTODYNIA OF LEFT BREAST: Primary | ICD-10-CM

## 2023-05-30 PROCEDURE — 4004F PT TOBACCO SCREEN RCVD TLK: CPT | Performed by: SURGERY

## 2023-05-30 PROCEDURE — 3017F COLORECTAL CA SCREEN DOC REV: CPT | Performed by: SURGERY

## 2023-05-30 PROCEDURE — G8419 CALC BMI OUT NRM PARAM NOF/U: HCPCS | Performed by: SURGERY

## 2023-05-30 PROCEDURE — 1123F ACP DISCUSS/DSCN MKR DOCD: CPT | Performed by: SURGERY

## 2023-05-30 PROCEDURE — G8399 PT W/DXA RESULTS DOCUMENT: HCPCS | Performed by: SURGERY

## 2023-05-30 PROCEDURE — 1090F PRES/ABSN URINE INCON ASSESS: CPT | Performed by: SURGERY

## 2023-05-30 PROCEDURE — G8428 CUR MEDS NOT DOCUMENT: HCPCS | Performed by: SURGERY

## 2023-05-30 PROCEDURE — 99213 OFFICE O/P EST LOW 20 MIN: CPT | Performed by: SURGERY

## 2023-05-30 PROCEDURE — 76642 ULTRASOUND BREAST LIMITED: CPT | Performed by: SURGERY

## 2023-05-30 NOTE — PROGRESS NOTES
HISTORY OF PRESENT ILLNESS  Chandra Lr is a 77 y.o. female     HPI ESTABLISHED patient here for diffuse LEFT breast pain, worst during the day with her bra. Takes Advil for RIGHT neck and back pain. Seen 2023 for LEFT breast pain    Personal history-    2 previous breast biopsies    2022-biopsy done at [facility]   Partial hysterectomy in the 70's , complete in the 90's   Cervical cancer 's   Melanoma in situ right knee 2020    Facial skin cancers        Family History:   Maternal aunt  from breast cancer in her 66's       Breast imaging-    Mammogram 10/2022 [facility]. LEFT breast bx was benign      Review of Systems      Physical Exam  Chest:   Breasts:     Right: No swelling, mass, skin change or tenderness. Left: Tenderness present. No swelling, mass or skin change. Lymphadenopathy:      Upper Body:      Right upper body: No axillary adenopathy. Left upper body: No axillary adenopathy. BREAST ULTRASOUND  Indication: left breast pain   Technique: The area was scanned using a high-frequency linear-array near-field transducer  Findings: No abnormal mass, lesion, or shadowing noted. No cysts. No axillary lymphadenopathy  Impression: Normal breast tissue   Disposition: No worrisome finding on ultrasound     ASSESSMENT and PLAN   Diagnosis Orders   1. Mastodynia of left breast          Total time spent on chart review and patient visit: 20 minutes     JACOBO. No abnormal finding on physical exam or ultrasound. Mammogram 10/2022 [facility], benign LEFT breast biopsy. Screening mammogram 10/2023. Offered dx mammogram now. Pt is willing to wait. Discussed breast pain which is not a typical symptom of breast cancer. Pain is likely musculoskeletal.  May use heat and ibuprofen as needed.

## 2023-07-24 ENCOUNTER — HOSPITAL ENCOUNTER (EMERGENCY)
Facility: HOSPITAL | Age: 68
Discharge: HOME | End: 2023-07-24
Attending: STUDENT IN AN ORGANIZED HEALTH CARE EDUCATION/TRAINING PROGRAM
Payer: MEDICARE

## 2023-07-24 ENCOUNTER — APPOINTMENT (OUTPATIENT)
Facility: HOSPITAL | Age: 68
End: 2023-07-24
Payer: MEDICARE

## 2023-07-24 VITALS
WEIGHT: 158 LBS | DIASTOLIC BLOOD PRESSURE: 87 MMHG | BODY MASS INDEX: 24.8 KG/M2 | HEART RATE: 85 BPM | TEMPERATURE: 98.3 F | OXYGEN SATURATION: 99 % | RESPIRATION RATE: 17 BRPM | HEIGHT: 67 IN | SYSTOLIC BLOOD PRESSURE: 147 MMHG

## 2023-07-24 DIAGNOSIS — R10.31 ABDOMINAL PAIN, RIGHT LOWER QUADRANT: Primary | ICD-10-CM

## 2023-07-24 LAB
ALBUMIN SERPL-MCNC: 4.1 G/DL (ref 3.5–5)
ALBUMIN/GLOB SERPL: 1.3 (ref 1.1–2.2)
ALP SERPL-CCNC: 71 U/L (ref 45–117)
ALT SERPL-CCNC: 22 U/L (ref 12–78)
ANION GAP SERPL CALC-SCNC: 7 MMOL/L (ref 5–15)
APPEARANCE UR: CLEAR
AST SERPL-CCNC: 19 U/L (ref 15–37)
BASOPHILS # BLD: 0 K/UL (ref 0–0.1)
BASOPHILS NFR BLD: 1 % (ref 0–1)
BILIRUB SERPL-MCNC: 0.4 MG/DL (ref 0.2–1)
BILIRUB UR QL: NEGATIVE
BUN SERPL-MCNC: 13 MG/DL (ref 6–20)
BUN/CREAT SERPL: 19 (ref 12–20)
CALCIUM SERPL-MCNC: 9.3 MG/DL (ref 8.5–10.1)
CHLORIDE SERPL-SCNC: 106 MMOL/L (ref 97–108)
CO2 SERPL-SCNC: 27 MMOL/L (ref 21–32)
COLOR UR: NORMAL
COMMENT:: NORMAL
CREAT SERPL-MCNC: 0.7 MG/DL (ref 0.55–1.02)
DIFFERENTIAL METHOD BLD: NORMAL
EOSINOPHIL # BLD: 0.1 K/UL (ref 0–0.4)
EOSINOPHIL NFR BLD: 1 % (ref 0–7)
ERYTHROCYTE [DISTWIDTH] IN BLOOD BY AUTOMATED COUNT: 11.9 % (ref 11.5–14.5)
GLOBULIN SER CALC-MCNC: 3.1 G/DL (ref 2–4)
GLUCOSE SERPL-MCNC: 95 MG/DL (ref 65–100)
GLUCOSE UR STRIP.AUTO-MCNC: NEGATIVE MG/DL
HCT VFR BLD AUTO: 39.5 % (ref 35–47)
HGB BLD-MCNC: 13.1 G/DL (ref 11.5–16)
HGB UR QL STRIP: NEGATIVE
IMM GRANULOCYTES # BLD AUTO: 0 K/UL (ref 0–0.04)
IMM GRANULOCYTES NFR BLD AUTO: 0 % (ref 0–0.5)
KETONES UR QL STRIP.AUTO: NEGATIVE MG/DL
LEUKOCYTE ESTERASE UR QL STRIP.AUTO: NEGATIVE
LIPASE SERPL-CCNC: 181 U/L (ref 73–393)
LYMPHOCYTES # BLD: 1.6 K/UL (ref 0.8–3.5)
LYMPHOCYTES NFR BLD: 26 % (ref 12–49)
MCH RBC QN AUTO: 30.8 PG (ref 26–34)
MCHC RBC AUTO-ENTMCNC: 33.2 G/DL (ref 30–36.5)
MCV RBC AUTO: 92.7 FL (ref 80–99)
MONOCYTES # BLD: 0.4 K/UL (ref 0–1)
MONOCYTES NFR BLD: 7 % (ref 5–13)
NEUTS SEG # BLD: 4.1 K/UL (ref 1.8–8)
NEUTS SEG NFR BLD: 65 % (ref 32–75)
NITRITE UR QL STRIP.AUTO: NEGATIVE
NRBC # BLD: 0 K/UL (ref 0–0.01)
NRBC BLD-RTO: 0 PER 100 WBC
PH UR STRIP: 7 (ref 5–8)
PLATELET # BLD AUTO: 221 K/UL (ref 150–400)
PMV BLD AUTO: 9 FL (ref 8.9–12.9)
POTASSIUM SERPL-SCNC: 3.8 MMOL/L (ref 3.5–5.1)
PROT SERPL-MCNC: 7.2 G/DL (ref 6.4–8.2)
PROT UR STRIP-MCNC: NEGATIVE MG/DL
RBC # BLD AUTO: 4.26 M/UL (ref 3.8–5.2)
SODIUM SERPL-SCNC: 140 MMOL/L (ref 136–145)
SP GR UR REFRACTOMETRY: 1.01 (ref 1–1.03)
SPECIMEN HOLD: NORMAL
SPECIMEN HOLD: NORMAL
UROBILINOGEN UR QL STRIP.AUTO: 0.2 EU/DL (ref 0.2–1)
WBC # BLD AUTO: 6.3 K/UL (ref 3.6–11)

## 2023-07-24 PROCEDURE — 81002 URINALYSIS NONAUTO W/O SCOPE: CPT

## 2023-07-24 PROCEDURE — 99284 EMERGENCY DEPT VISIT MOD MDM: CPT

## 2023-07-24 PROCEDURE — 80053 COMPREHEN METABOLIC PANEL: CPT

## 2023-07-24 PROCEDURE — 83690 ASSAY OF LIPASE: CPT

## 2023-07-24 PROCEDURE — 85025 COMPLETE CBC W/AUTO DIFF WBC: CPT

## 2023-07-24 PROCEDURE — 36415 COLL VENOUS BLD VENIPUNCTURE: CPT

## 2023-07-24 PROCEDURE — 74176 CT ABD & PELVIS W/O CONTRAST: CPT

## 2023-07-24 ASSESSMENT — ENCOUNTER SYMPTOMS
ABDOMINAL PAIN: 1
DIARRHEA: 0
VOMITING: 0
NAUSEA: 1
BACK PAIN: 1

## 2023-07-24 ASSESSMENT — PAIN SCALES - GENERAL: PAINLEVEL_OUTOF10: 7

## 2023-07-24 ASSESSMENT — PAIN - FUNCTIONAL ASSESSMENT: PAIN_FUNCTIONAL_ASSESSMENT: 0-10

## 2023-07-24 NOTE — ED TRIAGE NOTES
Pt arrives ambulatory with cc of RLQ abdominal pain as well as right flank pain, denies urinary symptoms, states she was at the chiropractor who took imaging and told her they think they see a stone on the right side. Denies vomiting, endorses nausea.

## 2023-08-02 ENCOUNTER — TELEPHONE (OUTPATIENT)
Age: 68
End: 2023-08-02

## 2023-08-02 NOTE — TELEPHONE ENCOUNTER
Identifiers x 2. States awakened last night with \"quivering\" in chest.  Unlike palpitations. Nothing she has experienced before. Occurred for a couple of minutes. Informed to continue to monitor. To discuss with MD.  Confirmed follow up appt.

## 2023-08-02 NOTE — TELEPHONE ENCOUNTER
Per Dr. Lucille Pedroza:  If this recurs over the next 1-2 weeks, would start toprol 25 mg once daily. If she starts this, follow up with me 1-2 months after starting. Identifiers x 2. Informed of the above. Plans to discuss possible need for echo at scheduled follow up. Will wait to start new medication. To call office if symptoms reoccur.

## 2023-08-03 ENCOUNTER — TELEPHONE (OUTPATIENT)
Age: 68
End: 2023-08-03

## 2023-08-03 NOTE — TELEPHONE ENCOUNTER
Attempted to reach patient by telephone. A message was left for return call. Sent over records request to [address] [place] [address] Records. Received Fax confirmation receipt.

## 2023-08-03 NOTE — TELEPHONE ENCOUNTER
Telephone call made to patient. Two patient identifiers verified. Spoke to patient and let her know that I requested the records from [facility]. She said she refused to take the medication for quivering until she knows exactly what it was. I said Beka Gomez NP can follow up with her concerns at her follow up appointment. All questions answered.      Future Appointments   Date Time Provider [address]   8/29/2023 11:30 AM MINDY Joel NP BS AMB   8/30/2023  9:00 AM MINDY Hawk - FELIPE ARAMBULA AMB   10/18/2023  1:40 PM MD MIGUEL Viveros AMB   11/16/2023 10:45 AM [facility] [address]

## 2023-08-03 NOTE — TELEPHONE ENCOUNTER
Pt is calling because she went to [facility] [facility] yesterday and they told her to follow up with her cardiologist.Pt said that did an EKG,Blood work,Chest Xray. Pt said they recommend she have some testing to be done.     Pt has been schedule to see the NP on 8/30/23    [phone]

## 2023-08-31 NOTE — TELEPHONE ENCOUNTER
FYI  Patient canceled, said she will be out of town and would call office back when she returns to reschedule. LABS and ADDITIONAL STUDIES:                        10.6   6.46  )-----------( 178      ( 31 Aug 2023 09:13 )             31.9     133<L>  |  93<L>  |  53<H>  ----------------------------<  171<H>     08-31  3.8   |  25  |  5.48<H>    Ca    10.2      31 Aug 2023 09:13  Phos  3.0     08-31  Mg     2.30     08-31    TPro  7.7  /  Alb  2.8<L>  /  TBili  1.2  /  DBili  x   /  AST  31  /  ALT  16  /  AlkPhos  309<H>  08-31    PT/INR: 11.3/1.01 (08-31-23 @ 09:13)  PTT: 31.6 (08-31-23 @ 09:13)    19:00 - VBG - pH: 7.44  | pCO2: 45    | pO2: 31    | Lactate: 1.9      Sedimentation Rate, Erythrocyte (08.30.23 @ 19:00)   Sedimentation Rate, Erythrocyte: 59 mm/hr  C-Reactive Protein, Serum (08.30.23 @ 19:00)   C-Reactive Protein, Serum: 27.3 mg/L    Hgb A1c: 5.0 (08-30-23 @ 19:00)

## 2023-09-06 NOTE — TELEPHONE ENCOUNTER
Call placed to patient. 2 identifiers received. Patient stated she did not want to take nurtec. Wanted it discontinued.
oral

## 2023-12-26 ENCOUNTER — APPOINTMENT (OUTPATIENT)
Facility: HOSPITAL | Age: 68
End: 2023-12-26
Payer: MEDICARE

## 2023-12-26 ENCOUNTER — HOSPITAL ENCOUNTER (EMERGENCY)
Facility: HOSPITAL | Age: 68
Discharge: HOME | End: 2023-12-26
Attending: EMERGENCY MEDICINE
Payer: MEDICARE

## 2023-12-26 VITALS
WEIGHT: 165.12 LBS | SYSTOLIC BLOOD PRESSURE: 156 MMHG | BODY MASS INDEX: 25.92 KG/M2 | DIASTOLIC BLOOD PRESSURE: 63 MMHG | HEART RATE: 90 BPM | HEIGHT: 67 IN | TEMPERATURE: 97.1 F | OXYGEN SATURATION: 97 % | RESPIRATION RATE: 18 BRPM

## 2023-12-26 DIAGNOSIS — S39.012A STRAIN OF LUMBAR REGION, INITIAL ENCOUNTER: Primary | ICD-10-CM

## 2023-12-26 DIAGNOSIS — M54.16 LUMBAR RADICULOPATHY: ICD-10-CM

## 2023-12-26 PROCEDURE — 93971 EXTREMITY STUDY: CPT

## 2023-12-26 PROCEDURE — 99284 EMERGENCY DEPT VISIT MOD MDM: CPT

## 2023-12-26 PROCEDURE — 72131 CT LUMBAR SPINE W/O DYE: CPT

## 2023-12-26 RX ORDER — METHYLPREDNISOLONE 4 MG/1
TABLET ORAL
Qty: 1 KIT | Refills: 0 | Status: SHIPPED | OUTPATIENT
Start: 2023-12-26 | End: 2024-01-01

## 2023-12-26 NOTE — ED TRIAGE NOTES
TRIAGE: Pt arrives with c/o numbness and tingling in right leg that started a \"few of days\" ago. Pt states pain is in ankle. Hx back issues. Denies bowel and bladder incontinence.

## 2023-12-26 NOTE — ED PROVIDER NOTES
Negative for color change and wound. Neurological:  Positive for numbness (left leg). Negative for syncope. Psychiatric/Behavioral:  The patient is not nervous/anxious. All other systems reviewed and are negative. PAST MEDICAL HISTORY     Past Medical History:   Diagnosis Date    Arthritis     Cervical ca ([address])     Hyperlipidemia     Hypothyroid     Leg swelling     Ocular migraine     Psychiatric disorder     depression    Skipped beats     Snoring          SURGICAL HISTORY       Past Surgical History:   Procedure Laterality Date    HYSTERECTOMY (CERVIX STATUS UNKNOWN)      ORTHOPEDIC SURGERY      L thumb    ORTHOPEDIC SURGERY      R thumb joint replacement         CURRENT MEDICATIONS       Previous Medications    IBUPROFEN (ADVIL;MOTRIN) 600 MG TABLET    Take 1 tablet by mouth every 8 hours as needed    MULTIPLE VITAMIN (MULTIVITAMIN ADULT PO)    Take by mouth       ALLERGIES     Codeine, Morphine, Promethazine, Sulfa antibiotics, and Tramadol    FAMILY HISTORY       Family History   Problem Relation Age of Onset    Heart Disease Father     Stroke Brother     Heart Disease Mother     Pulmonary Fibrosis Mother           SOCIAL HISTORY       Social History     Socioeconomic History    Marital status: Single   Tobacco Use    Smoking status: Never    Smokeless tobacco: Never   Substance and Sexual Activity    Alcohol use: Not Currently    Drug use: No         PHYSICAL EXAM       ED Triage Vitals   BP Temp Temp src Pulse Resp SpO2 Height Weight   -- -- -- -- -- -- -- --       There is no height or weight on file to calculate BMI. Physical Exam  Vitals and nursing note reviewed. Constitutional:       Appearance: Normal appearance. She is not ill-appearing. HENT:      Head: Normocephalic. Nose: Nose normal. No congestion. Mouth/Throat:      Mouth: Mucous membranes are moist.      Pharynx: No posterior oropharyngeal erythema. Eyes:      General:         Right eye: No discharge.

## 2023-12-27 LAB — ECHO BSA: 1.88 M2

## 2024-04-02 ENCOUNTER — HOSPITAL ENCOUNTER (EMERGENCY)
Facility: HOSPITAL | Age: 69
Discharge: HOME | End: 2024-04-03
Attending: EMERGENCY MEDICINE
Payer: MEDICARE

## 2024-04-02 ENCOUNTER — APPOINTMENT (OUTPATIENT)
Facility: HOSPITAL | Age: 69
End: 2024-04-02
Payer: MEDICARE

## 2024-04-02 VITALS
RESPIRATION RATE: 17 BRPM | DIASTOLIC BLOOD PRESSURE: 82 MMHG | HEART RATE: 88 BPM | SYSTOLIC BLOOD PRESSURE: 116 MMHG | OXYGEN SATURATION: 95 % | TEMPERATURE: 98.2 F

## 2024-04-02 DIAGNOSIS — R07.9 CHEST PAIN, UNSPECIFIED TYPE: Primary | ICD-10-CM

## 2024-04-02 LAB
ALBUMIN SERPL-MCNC: 3.7 G/DL (ref 3.5–5)
ALBUMIN/GLOB SERPL: 1.1 (ref 1.1–2.2)
ALP SERPL-CCNC: 93 U/L (ref 45–117)
ALT SERPL-CCNC: 22 U/L (ref 12–78)
ANION GAP SERPL CALC-SCNC: 4 MMOL/L (ref 5–15)
AST SERPL-CCNC: 15 U/L (ref 15–37)
BASOPHILS # BLD: 0 K/UL (ref 0–0.1)
BASOPHILS NFR BLD: 1 % (ref 0–1)
BILIRUB SERPL-MCNC: 0.3 MG/DL (ref 0.2–1)
BUN SERPL-MCNC: 18 MG/DL (ref 6–20)
BUN/CREAT SERPL: 21 (ref 12–20)
CALCIUM SERPL-MCNC: 9.5 MG/DL (ref 8.5–10.1)
CHLORIDE SERPL-SCNC: 108 MMOL/L (ref 97–108)
CO2 SERPL-SCNC: 27 MMOL/L (ref 21–32)
COMMENT:: NORMAL
COMMENT:: NORMAL
CREAT SERPL-MCNC: 0.84 MG/DL (ref 0.55–1.02)
D DIMER PPP FEU-MCNC: 0.22 MG/L FEU (ref 0–0.65)
DIFFERENTIAL METHOD BLD: NORMAL
EOSINOPHIL # BLD: 0.1 K/UL (ref 0–0.4)
EOSINOPHIL NFR BLD: 1 % (ref 0–7)
ERYTHROCYTE [DISTWIDTH] IN BLOOD BY AUTOMATED COUNT: 12.3 % (ref 11.5–14.5)
GLOBULIN SER CALC-MCNC: 3.3 G/DL (ref 2–4)
GLUCOSE SERPL-MCNC: 110 MG/DL (ref 65–100)
HCT VFR BLD AUTO: 40 % (ref 35–47)
HGB BLD-MCNC: 13.4 G/DL (ref 11.5–16)
IMM GRANULOCYTES # BLD AUTO: 0 K/UL (ref 0–0.04)
IMM GRANULOCYTES NFR BLD AUTO: 0 % (ref 0–0.5)
LYMPHOCYTES # BLD: 1.8 K/UL (ref 0.8–3.5)
LYMPHOCYTES NFR BLD: 28 % (ref 12–49)
MCH RBC QN AUTO: 30.8 PG (ref 26–34)
MCHC RBC AUTO-ENTMCNC: 33.5 G/DL (ref 30–36.5)
MCV RBC AUTO: 92 FL (ref 80–99)
MONOCYTES # BLD: 0.4 K/UL (ref 0–1)
MONOCYTES NFR BLD: 6 % (ref 5–13)
NEUTS SEG # BLD: 4 K/UL (ref 1.8–8)
NEUTS SEG NFR BLD: 64 % (ref 32–75)
NRBC # BLD: 0 K/UL (ref 0–0.01)
NRBC BLD-RTO: 0 PER 100 WBC
PLATELET # BLD AUTO: 203 K/UL (ref 150–400)
PMV BLD AUTO: 9.2 FL (ref 8.9–12.9)
POTASSIUM SERPL-SCNC: 3.6 MMOL/L (ref 3.5–5.1)
PROT SERPL-MCNC: 7 G/DL (ref 6.4–8.2)
RBC # BLD AUTO: 4.35 M/UL (ref 3.8–5.2)
SODIUM SERPL-SCNC: 139 MMOL/L (ref 136–145)
SPECIMEN HOLD: NORMAL
SPECIMEN HOLD: NORMAL
TROPONIN I SERPL HS-MCNC: 4 NG/L (ref 0–51)
TROPONIN I SERPL HS-MCNC: 5 NG/L (ref 0–51)
WBC # BLD AUTO: 6.4 K/UL (ref 3.6–11)

## 2024-04-02 PROCEDURE — 93005 ELECTROCARDIOGRAM TRACING: CPT | Performed by: EMERGENCY MEDICINE

## 2024-04-02 PROCEDURE — 71045 X-RAY EXAM CHEST 1 VIEW: CPT

## 2024-04-02 PROCEDURE — 85025 COMPLETE CBC W/AUTO DIFF WBC: CPT

## 2024-04-02 PROCEDURE — 84484 ASSAY OF TROPONIN QUANT: CPT

## 2024-04-02 PROCEDURE — 36415 COLL VENOUS BLD VENIPUNCTURE: CPT

## 2024-04-02 PROCEDURE — 85379 FIBRIN DEGRADATION QUANT: CPT

## 2024-04-02 PROCEDURE — 80053 COMPREHEN METABOLIC PANEL: CPT

## 2024-04-02 PROCEDURE — 99285 EMERGENCY DEPT VISIT HI MDM: CPT

## 2024-04-02 ASSESSMENT — PAIN DESCRIPTION - PAIN TYPE: TYPE: ACUTE PAIN

## 2024-04-02 ASSESSMENT — PAIN DESCRIPTION - DIRECTION: RADIATING_TOWARDS: SHOULDER AND BACK

## 2024-04-02 ASSESSMENT — PAIN DESCRIPTION - FREQUENCY: FREQUENCY: CONTINUOUS

## 2024-04-02 ASSESSMENT — PAIN DESCRIPTION - ONSET: ONSET: GRADUAL

## 2024-04-02 ASSESSMENT — PAIN DESCRIPTION - ORIENTATION: ORIENTATION: LEFT

## 2024-04-02 ASSESSMENT — PAIN - FUNCTIONAL ASSESSMENT
PAIN_FUNCTIONAL_ASSESSMENT: 0-10
PAIN_FUNCTIONAL_ASSESSMENT: ACTIVITIES ARE NOT PREVENTED
PAIN_FUNCTIONAL_ASSESSMENT: 0-10

## 2024-04-02 ASSESSMENT — PAIN SCALES - GENERAL
PAINLEVEL_OUTOF10: 3
PAINLEVEL_OUTOF10: 0

## 2024-04-02 ASSESSMENT — PAIN DESCRIPTION - LOCATION: LOCATION: CHEST

## 2024-04-02 ASSESSMENT — PAIN DESCRIPTION - DESCRIPTORS: DESCRIPTORS: ACHING;BURNING

## 2024-04-02 NOTE — ED TRIAGE NOTES
Pt comes in from home with CC of CP that started at 1830. Pt states there is a family hx of cardiac hx. Pt states pain is 3/10 that is on her left side of her chest that radiates to her back and shoulder. Pt denies SOB.     Pt took 325mg of aspirin per 911.

## 2024-04-03 LAB
EKG ATRIAL RATE: 86 BPM
EKG DIAGNOSIS: NORMAL
EKG P AXIS: 70 DEGREES
EKG P-R INTERVAL: 160 MS
EKG Q-T INTERVAL: 358 MS
EKG QRS DURATION: 72 MS
EKG QTC CALCULATION (BAZETT): 428 MS
EKG R AXIS: 4 DEGREES
EKG T AXIS: 22 DEGREES
EKG VENTRICULAR RATE: 86 BPM

## 2024-04-03 NOTE — ED PROVIDER NOTES
[facility] EMERGENCY DEP  EMERGENCY DEPARTMENT ENCOUNTER      Pt Name: Evelyn Alberto  MRN: [id number]  Birthdate 1955  Date of evaluation: 4/2/2024  Provider: Manuel Greenwood MD      HISTORY OF PRESENT ILLNESS      [facility]  69-year-old female with a past medical history of hyperlipidemia, hypothyroidism, and palpitations presenting to the emergency department due to chest pain.  At around 6:30 PM patient developed sudden onset of sharp stabbing left-sided chest pain that radiated to her left shoulder.  It lasted for a few minutes and resolved.  She says when she was with EMS she had an episode of similar pain in her left shoulder but no further chest pain.  She says she felt some slight nausea when her symptoms started but no shortness of breath.  No lightheadedness or syncope.  She says that few months ago she had a normal stress test.  No history of PE.  Non-smoker.      Nursing Notes were reviewed.    REVIEW OF SYSTEMS         Review of Systems  A complete review of systems was performed and all systems reviewed were negative unless otherwise document in the HPI      PAST MEDICAL HISTORY     Past Medical History:   Diagnosis Date    Arthritis     Cervical ca (HCC)     Hyperlipidemia     Hypothyroid     Leg swelling     Ocular migraine     Psychiatric disorder     depression    Skipped beats     Snoring          SURGICAL HISTORY       Past Surgical History:   Procedure Laterality Date    HYSTERECTOMY (CERVIX STATUS UNKNOWN)      ORTHOPEDIC SURGERY      L thumb    ORTHOPEDIC SURGERY      R thumb joint replacement         CURRENT MEDICATIONS       Previous Medications    IBUPROFEN (ADVIL;MOTRIN) 600 MG TABLET    Take 1 tablet by mouth every 8 hours as needed    MULTIPLE VITAMIN (MULTIVITAMIN ADULT PO)    Take by mouth       ALLERGIES     Codeine, Morphine, Promethazine, Sulfa antibiotics, and Tramadol    FAMILY HISTORY       Family History   Problem Relation Age of Onset    Heart Disease Father     Stroke Brother

## 2024-04-03 NOTE — ED NOTES
Patient received discharge instructions and verbalized understanding. She left ambulatory in no acute distress.

## 2024-04-03 NOTE — DISCHARGE INSTRUCTIONS
Return to the emergency department if you develop recurrent worsening symptoms or any other associated symptoms you find concerning.  In the meantime I recommend you follow-up with a cardiologist regarding her chest pain

## 2024-04-09 ENCOUNTER — OFFICE VISIT (OUTPATIENT)
Age: 69
End: 2024-04-09
Payer: MEDICARE

## 2024-04-09 ENCOUNTER — CLINICAL DOCUMENTATION (OUTPATIENT)
Age: 69
End: 2024-04-09

## 2024-04-09 VITALS — BODY MASS INDEX: 25.9 KG/M2 | WEIGHT: 165 LBS | HEIGHT: 67 IN

## 2024-04-09 DIAGNOSIS — N60.12 FIBROCYSTIC BREAST CHANGES OF BOTH BREASTS: ICD-10-CM

## 2024-04-09 DIAGNOSIS — N60.11 FIBROCYSTIC BREAST CHANGES OF BOTH BREASTS: ICD-10-CM

## 2024-04-09 DIAGNOSIS — Z12.31 ENCOUNTER FOR SCREENING MAMMOGRAM FOR BREAST CANCER: ICD-10-CM

## 2024-04-09 DIAGNOSIS — N64.4 MASTODYNIA OF LEFT BREAST: Primary | ICD-10-CM

## 2024-04-09 PROCEDURE — 1090F PRES/ABSN URINE INCON ASSESS: CPT | Performed by: NURSE PRACTITIONER

## 2024-04-09 PROCEDURE — 99213 OFFICE O/P EST LOW 20 MIN: CPT | Performed by: NURSE PRACTITIONER

## 2024-04-09 PROCEDURE — 1123F ACP DISCUSS/DSCN MKR DOCD: CPT | Performed by: NURSE PRACTITIONER

## 2024-04-09 PROCEDURE — G8419 CALC BMI OUT NRM PARAM NOF/U: HCPCS | Performed by: NURSE PRACTITIONER

## 2024-04-09 PROCEDURE — 3017F COLORECTAL CA SCREEN DOC REV: CPT | Performed by: NURSE PRACTITIONER

## 2024-04-09 PROCEDURE — G8399 PT W/DXA RESULTS DOCUMENT: HCPCS | Performed by: NURSE PRACTITIONER

## 2024-04-09 PROCEDURE — 1036F TOBACCO NON-USER: CPT | Performed by: NURSE PRACTITIONER

## 2024-04-09 PROCEDURE — G8427 DOCREV CUR MEDS BY ELIG CLIN: HCPCS | Performed by: NURSE PRACTITIONER

## 2024-04-09 ASSESSMENT — ENCOUNTER SYMPTOMS: BREAST PAIN: 1

## 2024-04-09 NOTE — PROGRESS NOTES
Mood and Affect: Mood normal.         Speech: Speech normal.         Behavior: Behavior normal.          Ht 1.702 m (5' 7\")   Wt 74.8 kg (165 lb)   BMI 25.84 kg/m²       ASSESSMENT and PLAN   Diagnosis Orders   1. Mastodynia of left breast        2. Fibrocystic breast changes of both breasts        3. Encounter for screening mammogram for breast cancer  [facility] DIGITAL SCREEN BILATERAL          Normal exam with no evidence of malignancy.  LEFT breast pain x 3 weeks with no associated symptoms.  Seen a year ago for similar pain.  We reviewed fibrocystic changes to the breast, the associated discomfort and the often benign nature of this process. We also reviewed possible relief through symptom management (supportive bra, heat and/or ice and OTC pain medication such a Tylenol or Motrin PRN and the use of Vitamin E (400 units/day) and primrose oil (500mg/two times a day).  Discussed mixed evidence behind the use of Vit E and primrose oil.  Handout given.    Has not had a mammogram in two years.  Should schedule [facility]SMammogram 3D ASAP.  RTC here PRN.  She is comfortable with this plan.  All questions answered and she stated understanding.

## 2024-04-09 NOTE — PROGRESS NOTES
The patient called stating she is having LEFT breast pain that has gotten worse in the last several days. She was seen last year for the same problem and told it was most likely musculoskeletal. The patient wanted to get in today to be seen because she is going away and her partner is having surgery.

## 2024-09-17 NOTE — TELEPHONE ENCOUNTER
Attempted to call Ezequiel again for update on return, without success.  Spoke with Ivan BERGMAN, he has been unable to get a ride/care giver for [place] today.  Will notify Dr. THURMAN that patient will discharged pre-noon tomorrow.   Patient was in office today for test. Pt stated her medication list is incorrect and that she is not taking any of these medications.  Updated medication list per Dr. Nikki Anguiano

## 2025-02-04 ENCOUNTER — TELEPHONE (OUTPATIENT)
Age: 70
End: 2025-02-04

## 2025-02-04 NOTE — TELEPHONE ENCOUNTER
I reached  out to the pt and left her a vm letting her  know that we will have to cancel her appt for 2/11/2025, because our doctor needs some recent imaging to be done before the pt can be seen in the office by him.2/4/2025

## 2025-02-11 ENCOUNTER — TELEMEDICINE (OUTPATIENT)
Age: 70
End: 2025-02-11
Payer: MEDICARE

## 2025-02-11 DIAGNOSIS — I67.1 CEREBRAL ANEURYSM: Primary | ICD-10-CM

## 2025-02-11 PROCEDURE — 1159F MED LIST DOCD IN RCRD: CPT | Performed by: PSYCHIATRY & NEUROLOGY

## 2025-02-11 PROCEDURE — 98009 SYNCH AUDIO-ONLY NEW LOW 30: CPT | Performed by: PSYCHIATRY & NEUROLOGY

## 2025-02-11 RX ORDER — ESTRADIOL 0.1 MG/G
CREAM VAGINAL DAILY
COMMUNITY

## 2025-02-11 NOTE — PROGRESS NOTES
New Patient Telephone Visit         CONSULT INFORMATION:  Date of Service: 2025  Consultation Requested by: Celia    PATIENT IDENTIFICATION:  Patient Name: Evelyn Alberto  : 1955      CC: aneurysm    HISTORY OF PRESENT ILLNESS:  70 y.o. RH White (non-) [1]female referred for aneurysm. Was seen in the past by Dr Hernández in 2018, MRA had shown a possible small dorsal R ICA aneurysm. CTA's and MRA's since have been stable or not completely visualized aneurysm. Most recent this year it is not seen. She denies focal neuro sx. Has had intermittent head pressure. No family hx of SAH.        Past Medical History:   Diagnosis Date    Arthritis     Cervical ca (HCC)     Hyperlipidemia     Hypothyroid     Leg swelling     Ocular migraine     Psychiatric disorder     depression    Skipped beats     Snoring        Past Surgical History:   Procedure Laterality Date    HYSTERECTOMY (CERVIX STATUS UNKNOWN)      ORTHOPEDIC SURGERY      L thumb    ORTHOPEDIC SURGERY      R thumb joint replacement       Current Outpatient Medications   Medication Sig    Multiple Vitamin (MULTIVITAMIN ADULT PO) Take by mouth    ibuprofen (ADVIL;MOTRIN) 600 MG tablet Take 1 tablet by mouth every 8 hours as needed     No current facility-administered medications for this visit.       Allergies   Allergen Reactions    Codeine Nausea Only    Morphine Itching    Promethazine Other (See Comments)     Parkinson like symptoms    Sulfa Antibiotics      Other reaction(s): Unknown (comments)  Other reaction(s): Unknown (comments)    Tramadol Nausea Only         Social History  Social History     Socioeconomic History    Marital status: Single     Spouse name: Not on file    Number of children: Not on file    Years of education: Not on file    Highest education level: Not on file   Occupational History    Not on file   Tobacco Use    Smoking status: Never    Smokeless tobacco: Never   Vaping Use    Vaping status: Never Used   Substance and

## 2025-02-11 NOTE — PROGRESS NOTES
Phone call to patient in preparation for Virtual/phone visit with provider.  Name and  verified.  Patient unable to obtain vital signs at home.    New patient referred by MINDY Pineda presenting with Cerebral aneurysm.  Patient reports pressure in her head, pain on the right side of her head radiating to behind her right eye, episodes of nausea and lightheadedness.  Reports symptoms since 2024.  No new problems reported.

## 2025-04-28 ENCOUNTER — TRANSCRIBE ORDERS (OUTPATIENT)
Facility: HOSPITAL | Age: 70
End: 2025-04-28

## 2025-04-28 DIAGNOSIS — R51.9 FACIAL PAIN: Primary | ICD-10-CM

## 2025-09-02 ENCOUNTER — OFFICE VISIT (OUTPATIENT)
Age: 70
End: 2025-09-02
Payer: MEDICARE

## 2025-09-02 DIAGNOSIS — N60.11 FIBROCYSTIC BREAST CHANGES OF BOTH BREASTS: ICD-10-CM

## 2025-09-02 DIAGNOSIS — N60.12 FIBROCYSTIC BREAST CHANGES OF BOTH BREASTS: ICD-10-CM

## 2025-09-02 DIAGNOSIS — N64.4 MASTODYNIA OF LEFT BREAST: Primary | ICD-10-CM

## 2025-09-02 DIAGNOSIS — Z12.31 ENCOUNTER FOR SCREENING MAMMOGRAM FOR BREAST CANCER: ICD-10-CM

## 2025-09-02 PROCEDURE — 1123F ACP DISCUSS/DSCN MKR DOCD: CPT | Performed by: NURSE PRACTITIONER

## 2025-09-02 PROCEDURE — 1090F PRES/ABSN URINE INCON ASSESS: CPT | Performed by: NURSE PRACTITIONER

## 2025-09-02 PROCEDURE — 1159F MED LIST DOCD IN RCRD: CPT | Performed by: NURSE PRACTITIONER

## 2025-09-02 PROCEDURE — 99213 OFFICE O/P EST LOW 20 MIN: CPT | Performed by: NURSE PRACTITIONER

## 2025-09-02 PROCEDURE — G8421 BMI NOT CALCULATED: HCPCS | Performed by: NURSE PRACTITIONER

## 2025-09-02 PROCEDURE — G8427 DOCREV CUR MEDS BY ELIG CLIN: HCPCS | Performed by: NURSE PRACTITIONER

## 2025-09-02 PROCEDURE — 1160F RVW MEDS BY RX/DR IN RCRD: CPT | Performed by: NURSE PRACTITIONER

## 2025-09-02 PROCEDURE — 1036F TOBACCO NON-USER: CPT | Performed by: NURSE PRACTITIONER

## 2025-09-02 PROCEDURE — G8399 PT W/DXA RESULTS DOCUMENT: HCPCS | Performed by: NURSE PRACTITIONER

## 2025-09-02 PROCEDURE — 3017F COLORECTAL CA SCREEN DOC REV: CPT | Performed by: NURSE PRACTITIONER
